# Patient Record
Sex: FEMALE | NOT HISPANIC OR LATINO
[De-identification: names, ages, dates, MRNs, and addresses within clinical notes are randomized per-mention and may not be internally consistent; named-entity substitution may affect disease eponyms.]

---

## 2017-03-13 PROBLEM — R73.01 IMPAIRED FASTING GLUCOSE: Status: ACTIVE | Noted: 2017-03-13

## 2018-03-05 PROBLEM — M81.0 AGE-RELATED OSTEOPOROSIS WITHOUT CURRENT PATHOLOGICAL FRACTURE: Status: ACTIVE | Noted: 2018-03-05

## 2019-03-18 PROBLEM — Z91.09 ENVIRONMENTAL ALLERGIES: Status: ACTIVE | Noted: 2019-03-18

## 2019-04-30 PROBLEM — Z00.00 ENCOUNTER FOR PREVENTIVE HEALTH EXAMINATION: Status: ACTIVE | Noted: 2019-04-30

## 2019-05-07 ENCOUNTER — RECORD ABSTRACTING (OUTPATIENT)
Age: 54
End: 2019-05-07

## 2019-05-07 DIAGNOSIS — R73.01 IMPAIRED FASTING GLUCOSE: ICD-10-CM

## 2019-05-07 DIAGNOSIS — Z87.39 PERSONAL HISTORY OF OTHER DISEASES OF THE MUSCULOSKELETAL SYSTEM AND CONNECTIVE TISSUE: ICD-10-CM

## 2019-05-07 DIAGNOSIS — L80 VITILIGO: ICD-10-CM

## 2019-05-07 DIAGNOSIS — Z87.19 PERSONAL HISTORY OF OTHER DISEASES OF THE DIGESTIVE SYSTEM: ICD-10-CM

## 2019-05-07 DIAGNOSIS — H04.123 DRY EYE SYNDROME OF BILATERAL LACRIMAL GLANDS: ICD-10-CM

## 2019-05-07 RX ORDER — GINGER ROOT/GINGER ROOT EXT 262.5 MG
600-800 CAPSULE ORAL
Refills: 0 | Status: ACTIVE | COMMUNITY

## 2019-05-17 ENCOUNTER — APPOINTMENT (OUTPATIENT)
Dept: ENDOCRINOLOGY | Facility: CLINIC | Age: 54
End: 2019-05-17
Payer: COMMERCIAL

## 2019-05-17 VITALS
HEIGHT: 66 IN | DIASTOLIC BLOOD PRESSURE: 68 MMHG | BODY MASS INDEX: 19.29 KG/M2 | HEART RATE: 81 BPM | WEIGHT: 120 LBS | SYSTOLIC BLOOD PRESSURE: 98 MMHG

## 2019-05-17 PROCEDURE — 99213 OFFICE O/P EST LOW 20 MIN: CPT

## 2019-05-17 RX ORDER — ALENDRONATE SODIUM 70 MG/1
70 TABLET ORAL
Refills: 0 | Status: DISCONTINUED | COMMUNITY
End: 2019-05-17

## 2019-05-17 RX ORDER — RISEDRONATE SODIUM 30.1; 4.9 MG/1; MG/1
35 TABLET, DELAYED RELEASE ORAL
Refills: 0 | Status: DISCONTINUED | COMMUNITY
End: 2019-05-17

## 2019-05-27 NOTE — HISTORY OF PRESENT ILLNESS
[FreeTextEntry1] : May 17, 2019\par \par 55 yo with severe osteoporosis of the spine.   She did not tolerate oral bisphosphonates.\par She should receive an anabolic.\par Will request Tymlos 80 mg daily.\par Her [facility] def[facility] [place] \par \par \par \par Has [facility] [place] [facility]  Rx BIN [id number]\par RxPCN [id number]\par RxGRP [id number]\par ID [id number] via Karthik Burch her        phone [phone]    \par \par To [facility] for X-ray spine \par \par \par \par Previous notes from eClinical Works appended below.\par \par   August 9, 2018\par            .\par            PCP: Dr. Gita guan [phone]\par            .\par            CC: Osteoporosis, at [facility]:\par             2/1/2018 showed\par             spine T -3.2 down 17.2 % from 2005\par            .\par            52 yo with osteoporosis of spine, T score - 3.2\par            CTXs over 1000, started on alendronate, but developed severe heartburn so switched to delayed release Atelvia, but with similar symptoms.\par            .\par            Impression: Markers of bone turnover showed she was absorbing and responding to medication; however, she does not tolerate oral antiresorptive medication.\par            .\par            Plan: Needs parenteral treatment. For spine T -3.2, at this point, it would make sense to treat with anabolic (Forteo or Tymlos) for 1 1/2 - 2 years followed by Prolia for as long as needed and then completing cycle with Reclast. She will think about this. \par            .==\par            .\par            July 13, 2018\par            .\par            PCP: Dr. Gita guan [phone]\par            .\par            CC: Osteoporosis, at [facility]:\par             2/1/2018 showed\par             spine T -3.2 down 17.2 % from 2005\par            .\par            52 yo with osteoporosis of the spine: T score -.3.2\par            All studies for metabolic contributors to osteoporosis were negative.\par            In February CTXs CTXs 1158\par            and repeat and by \par            7/5/2018 CTX s 697 suggesting good absorption of alendronate.\par            However, she developed heartburn and the alendronate was discontinued. . \par            .\par            Impression: ntire\par            Plan: Trial of Atelvia 35 mg after breakfast weekly. She will notify me if any symptoms develop. ROV in about 4 months.\par            Thank you. -\par            .

## 2019-07-09 ENCOUNTER — APPOINTMENT (OUTPATIENT)
Dept: ENDOCRINOLOGY | Facility: CLINIC | Age: 54
End: 2019-07-09
Payer: COMMERCIAL

## 2019-07-09 PROCEDURE — 99213 OFFICE O/P EST LOW 20 MIN: CPT

## 2019-07-09 NOTE — PHYSICAL EXAM
[Alert] : alert [No Acute Distress] : no acute distress [Well Nourished] : well nourished [Well Developed] : well developed [Healthy Appearance] : healthy appearance [No Stigmata of Cushings Syndrome] : no stigmata of cushings syndrome [Normal Voice/Communication] : normal voice communication [Normal Insight/Judgement] : insight and judgment were intact [Normal Affect] : the affect was normal [Normal Mood] : the mood was normal

## 2019-07-09 NOTE — HISTORY OF PRESENT ILLNESS
[FreeTextEntry1] : July 9, 2019\par  PCP: Dr. 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  \par To start on Tymlos.\par Visits for instruction and to plan follow up.  \par \par Plan:  BMP etc in 3 weeks and in 4 months (October) and ROV November.  \par            .\par \par \par \par May 17, 2019\par \par 55 yo with severe osteoporosis of the spine.   She did not tolerate oral bisphosphonates.\par She should receive an anabolic.\par Will request Tymlos 80 mg daily.\par Her [facility] [place] at \par \par \par \par Has [facility] [place]  Rx BIN [id number]\par RxPCN [id number]\par RxGRP [id number]\par ID [id number] via Karthik Burch her        phone [phone]    \par \par To [facility] for X-ray spine \par \par \par \par Previous notes from eClinical Works appended below.\par \par   August 9, 2018\par            .\par            PCP: Dr. Gita Feldmna f [phone]\par            .\par            CC: Osteoporosis, at [facility] Imaging:\par             2/1/2018 showed\par             spine T -3.2 down 17.2 % from 2005\par            .\par            54 yo with osteoporosis of spine, T score - 3.2\par            CTXs over 1000, started on alendronate, but developed severe heartburn so switched to delayed release Atelvia, but with similar symptoms.\par            .\par            Impression: Markers of bone turnover showed she was absorbing and responding to medication; however, she does not tolerate oral antiresorptive medication.\par            .\par            Plan: Needs parenteral treatment. For spine T -3.2, at this point, it would make sense to treat with anabolic (Forteo or Tymlos) for 1 1/2 - 2 years followed by Prolia for as long as needed and then completing cycle with Reclast. She will think about this. \par            .==\par            .\par            July 13, 2018\par            .\par            PCP: Dr. Gita guan [phone]\par            .\par            CC: Osteoporosis, at [facility] Imaging:\par             2/1/2018 showed\par             spine T -3.2 down 17.2 % from 2005\par            .\par            54 yo with osteoporosis of the spine: T score -.3.2\par            All studies for metabolic contributors to osteoporosis were negative.\par            In February CTXs CTXs 1158\par            and repeat and by \par            7/5/2018 CTX s 697 suggesting good absorption of alendronate.\par            However, she developed heartburn and the alendronate was discontinued. . \par            .\par            Impression: ntire\par            Plan: Trial of Atelvia 35 mg after breakfast weekly. She will notify me if any symptoms develop. ROV in about 4 months.\par            Thank you. -\par            .

## 2019-07-09 NOTE — ASSESSMENT
[FreeTextEntry1] : ~\par Did well with self injection of Tymlos.\par Follow up labs in 3 weeks and in about 4-5 months\par Potential side effects discussed

## 2019-08-21 ENCOUNTER — RX RENEWAL (OUTPATIENT)
Age: 54
End: 2019-08-21

## 2019-10-09 ENCOUNTER — HOSPITAL ENCOUNTER (OUTPATIENT)
Dept: HOSPITAL 74 - FASU | Age: 54
Discharge: HOME | End: 2019-10-09
Attending: ORTHOPAEDIC SURGERY
Payer: COMMERCIAL

## 2019-10-09 VITALS — DIASTOLIC BLOOD PRESSURE: 62 MMHG | SYSTOLIC BLOOD PRESSURE: 110 MMHG | HEART RATE: 62 BPM | TEMPERATURE: 97.9 F

## 2019-10-09 VITALS — BODY MASS INDEX: 19.3 KG/M2

## 2019-10-09 DIAGNOSIS — M18.12: Primary | ICD-10-CM

## 2019-10-09 PROCEDURE — 0LX80ZZ TRANSFER LEFT HAND TENDON, OPEN APPROACH: ICD-10-PCS | Performed by: ORTHOPAEDIC SURGERY

## 2019-10-14 NOTE — OP
DATE OF OPERATION:  10/09/2019

 

PREOPERATIVE DIAGNOSIS:  Left basal joint arthritis.

 

POSTOPERATIVE DIAGNOSIS:  Left basal joint arthritis.

 

OPERATIVE PROCEDURE:

1.  Left basal joint arthroplasty.

2.  Left carpometacarpal joint tendon transfer.

 

SURGEON:  Rohit Ghosh MD

 

ASSISTANT:  FANNY Quiles

 

ANESTHESIA:  Regional.

 

COMPLICATIONS:  None.

 

ESTIMATED BLOOD LOSS:  Minimal.

 

INDICATION FOR PROCEDURE:  The patient is a 54-year-old female, status post right

basal joint arthroplasty, from which she has done very well, was indicated for left

basal joint arthroplasty at the patient's request.  Risks, benefits, and alternatives

were discussed with patient at length.  Proper informed consent was obtained.

 

PROCEDURE:  After proper identification of the patient and the correct operative

site, patient was brought to the operating room, placed supine on the operating

table.  Prominences were well padded.  Sedation and regional anesthesia were given. 

Left upper extremity was prepped and draped in usual sterile fashion.  Well-padded

tourniquet was placed as well as a sterile prep.  Esmarch bandage was used to

exsanguinate the left upper extremity.  Tourniquet was inflated to 250 mmHg.

 

Curvilinear incision made at the base of the thumb metacarpal with a Seo-type

approach.  Incision was taken sharply through the skin, with blunt dissection through

the subcutaneous tissues.  Neurovascular structures were carefully protected.  Thenar

muscles were elevated off the carpometacarpal joint.  Longitudinal incision made at

the carpometacarpal joint and the soft tissue was elevated off of the trapezium in

whole.  Severe arthrosis was noted at the carpometacarpal joint, and the trapezium

was excised in whole.  Arthroplasty of _____ space was found to be adequate.  A

suspensionplasty/arthroplasty was performed by placing an Arthrex SwiveLock anchor at

the base of the index metacarpal, attached to an Arthrex FiberTape-type suture.  This

was placed in hammock fashion beneath the thumb metacarpal, suspending it in its

natural position and secured to the thumb metacarpal with a second SwiveLock anchor. 

This was done with appropriate tensioning and achieved full range of motion with good

positioning once it was completed.  The arthroplasty space was then closed, doing a

side-to-side repair of the joint capsule.  Tendon transfer of the abductor pollicis

brevis to the joint capsule repair was performed for added stability.  The wound was

irrigated and repaired with 4-0 Vicryl and 4-0 Monocryl sutures.  Steri-Strips,

sterile dressings were applied.  Splint was placed.  Patient was reversed from

anesthesia and brought to recovery in stable condition.  She tolerated the procedure

well.

 

Negro Escobedo, the assistant, was integral throughout the procedure.  Procedure

could not have been performed without a skilled operative assistant.

 

 

FLORIAN JENSEN/[id number]

DD: 10/14/2019 17:31

DT: 10/14/2019 20:31

Job #:  [id number]

## 2019-11-26 ENCOUNTER — APPOINTMENT (OUTPATIENT)
Dept: ENDOCRINOLOGY | Facility: CLINIC | Age: 54
End: 2019-11-26
Payer: COMMERCIAL

## 2019-11-26 VITALS
BODY MASS INDEX: 19.13 KG/M2 | WEIGHT: 119 LBS | HEIGHT: 66 IN | HEART RATE: 81 BPM | DIASTOLIC BLOOD PRESSURE: 60 MMHG | SYSTOLIC BLOOD PRESSURE: 100 MMHG

## 2019-11-26 PROCEDURE — 99214 OFFICE O/P EST MOD 30 MIN: CPT

## 2019-11-29 NOTE — PHYSICAL EXAM
[Alert] : alert [Well Nourished] : well nourished [No Acute Distress] : no acute distress [Well Developed] : well developed [Healthy Appearance] : healthy appearance [Normal Voice/Communication] : normal voice communication [No Stigmata of Cushings Syndrome] : no stigmata of cushings syndrome [Normal Insight/Judgement] : insight and judgment were intact [Normal Affect] : the affect was normal [Normal Mood] : the mood was normal

## 2019-11-29 NOTE — HISTORY OF PRESENT ILLNESS
[FreeTextEntry1] : Nov 26, 2019\par  PCP: Dr. Gita guan [phone]\par          Ortho:  Dr. Gunnar Bear  for hand  \par            .\par            CC: Osteoporosis, at [facility]:\par             2/1/2018 showed\par             spine T -3.2 down 17.2 % from 2005\par \par CC: Osteoporosis, at [facility]:\par             2/1/2018 showed\par             spine T -3.2 down 17.2 % from 2005\par        2011 - arthritis of hands -   surg R hand 2016,  surg left hand 2019 - by Dr. Bear\par               it is called "basal joint arthritis".  \par \par Taking Tymlos without difficulty  \par Next BD can be after 2/1/2020 \par            \par \par Started on Tymlos July 19, 2019 after cleared with her orthopedic doctor.\par \par Currently out of work.   2\par \par   \par            .\par \par \par \par \par \par \par July 9, 2019\par  PCP: Dr. 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  \par To start on Tymlos.\par Visits for instruction and to plan follow up.  \par \par Plan:  BMP etc in 3 weeks and in 4 months (October) and ROV November.  \par            .\par \par \par \par May 17, 2019\par \par 55 yo with severe osteoporosis of the spine.   She did not tolerate oral bisphosphonates.\par She should receive an anabolic.\par Will request Tymlos 80 mg daily.\par Her [facility] def[facility] [place] at \par \par \par \par Has [facility] [place]  Rx BIN [id number]\par RxPCN [id number]\par RxGRP [id number]\par ID [id number] via Karthik Burch her        phone [phone]    \par \par To [facility] for X-ray spine \par \par \par \par Previous notes from eClinical Works appended below.\par \par   August 9, 2018\par            .\par            PCP: Dr. Gita guan [phone]\par            .\par            CC: Osteoporosis, at [facility]:\par             2/1/2018 showed\par             spine T -3.2 down 17.2 % from 2005\par            .\par            52 yo with osteoporosis of spine, T score - 3.2\par            CTXs over 1000, started on alendronate, but developed severe heartburn so switched to delayed release Atelvia, but with similar symptoms.\par            .\par            Impression: Markers of bone turnover showed she was absorbing and responding to medication; however, she does not tolerate oral antiresorptive medication.\par            .\par            Plan: Needs parenteral treatment. For spine T -3.2, at this point, it would make sense to treat with anabolic (Forteo or Tymlos) for 1 1/2 - 2 years followed by Prolia for as long as needed and then completing cycle with Reclast. She will think about this. \par            .==\par            .\par            July 13, 2018\par            .\par            PCP: Dr. Gita guan [phone]\par            .\par            CC: Osteoporosis, at [facility] Imaging:\par             2/1/2018 showed\par             spine T -3.2 down 17.2 % from 2005\par            .\par            52 yo with osteoporosis of the spine: T score -.3.2\par            All studies for metabolic contributors to osteoporosis were negative.\par            In February CTXs CTXs 1158\par            and repeat and by \par            7/5/2018 CTX s 697 suggesting good absorption of alendronate.\par            However, she developed heartburn and the alendronate was discontinued. . \par            .\par            Impression: ntire\par            Plan: Trial of Atelvia 35 mg after breakfast weekly. She will notify me if any symptoms develop. ROV in about 4 months.\par            Thank you. -shruthi\par            .

## 2020-04-02 ENCOUNTER — APPOINTMENT (OUTPATIENT)
Dept: ENDOCRINOLOGY | Facility: CLINIC | Age: 55
End: 2020-04-02
Payer: COMMERCIAL

## 2020-04-02 PROCEDURE — 99443: CPT

## 2020-04-02 NOTE — ASSESSMENT
[FreeTextEntry1] : Had good response to Tymlos.\par Will consider for up to two years and then switch to antiresorptive most likely.

## 2020-09-17 ENCOUNTER — APPOINTMENT (OUTPATIENT)
Dept: ENDOCRINOLOGY | Facility: CLINIC | Age: 55
End: 2020-09-17
Payer: COMMERCIAL

## 2020-09-17 VITALS
HEIGHT: 66 IN | BODY MASS INDEX: 18.32 KG/M2 | WEIGHT: 114 LBS | SYSTOLIC BLOOD PRESSURE: 100 MMHG | OXYGEN SATURATION: 99 % | DIASTOLIC BLOOD PRESSURE: 60 MMHG | HEART RATE: 82 BPM

## 2020-09-17 LAB
25(OH)D3 SERPL-MCNC: 38 NG/ML (ref 30–100)
ALB/GLOBRATIO, 58C: 1.6 (CALC) (ref 1–2.5)
ALBUMIN SERPL-MCNC: 4.6 G/DL (ref 3.6–5.1)
ALKALINE PHOSPHATASE, TOTAL, 25002000: 96 U/L (ref 37–153)
ALT SERPL-CCNC: 15 U/L (ref 6–29)
AST SERPL W P-5'-P-CCNC: 22 U/L (ref 10–35)
BILIRUB SERPL-MCNC: 1.5 MG/DL (ref 0.2–1.2)
BILIRUBIN, DIRECT,CBIL: 0.3 MG/DL (ref 0–0.2)
BILIRUBIN, INDIRECT,UBIL: 1.2 MG/DL (CALC) (ref 0.2–1.2)
BUN SERPL-MCNC: 10 MG/DL (ref 7–25)
BUN/CREATININE RATIO,BUCR: NORMAL (CALC) (ref 6–22)
C TELOPEPTIDE (CTX): 877 PG/ML
CALCIUM SERPL-MCNC: 10.3 MG/DL (ref 8.6–10.4)
CHLORIDE SERPL-SCNC: 103 MMOL/L (ref 98–110)
CHOL/HDL RATIO,CHHDX: 2.2 (CALC)
CHOLEST SERPL-MCNC: 203 MG/DL
CO2 SERPL-SCNC: 28 MMOL/L (ref 20–32)
CREAT SERPL-MCNC: 0.6 MG/DL (ref 0.5–1.05)
GLOBULIN,GLOB: 2.8 G/DL (CALC) (ref 1.9–3.7)
GLUCOSE SERPL-MCNC: 91 MG/DL (ref 65–99)
HBA1C MFR BLD HPLC: 5.6 % OF TOTAL HGB
HDLC SERPL-MCNC: 91 MG/DL
LDL-CHOLESTEROL: 92 MG/DL (CALC)
Lab: 43 NG/ML (ref 8–32)
NON-HDL CHOLESTEROL, 011976: 112 MG/DL (CALC)
PHOSPHATE SERPL-MCNC: 4 MG/DL (ref 2.5–4.5)
POTASSIUM SERPL-SCNC: 4.3 MMOL/L (ref 3.5–5.3)
PROT SERPL-MCNC: 7.4 G/DL (ref 6.1–8.1)
SODIUM SERPL-SCNC: 140 MMOL/L (ref 135–146)
TRIGL SERPL-MCNC: 105 MG/DL (ref ?–150)
TSH SERPL DL<=0.005 MIU/L-ACNC: 0.87 MIU/L (ref 0.4–4.5)

## 2020-09-17 PROCEDURE — 99214 OFFICE O/P EST MOD 30 MIN: CPT

## 2020-09-18 NOTE — HISTORY OF PRESENT ILLNESS
[FreeTextEntry1] : Sep 17, 2020      in person\par \par PCP: Dr. Gita Feldman f [phone]\par          Ortho:  Dr. Gunnar Bear  for hand  \par            .\par CC: Osteoporosis, at [facility] Imaging:\par             2/1/2018 showed\par             spine T -3.2 down 17.2 % from 2005\par        2011 - arthritis of hands -   surg R hand 2016,  surg left hand 2019 - by Dr. Bear\par               it is called "basal joint arthritis".  \par \par Started on Tymlos July 2019.    Will aim to switch to Prolia as of May or June of 2021 at latest.  \par Most recent bone density at [facility] 2/28/2020 showed spine T score improved to -2.8, FN -1.8, TH -1.5\par \par \par \par \par \par Lab test on\par 9/11/2020 include\par TSH 0.87\par osteocalcin 43\par CTXs 877\par 25 OH vitamin D 38 b\par \par \par \par Apr 02, 2020\par \par This is a 21+ minute Tele-Phonic encounter with an established patient which was initiated by the patient during a time scheduled for a visit and the patient is aware that this may be a billable encounter.  The patient has not seen a provider of my specialty (Endocrinology) within out group in the past 7 days and this encounter is not anticipated to result in a scheduled in-person visit within he next 7 days.\par The reason for this Tele-Phonic encounter is listed below under "CC:"\par Verbal consent was discussed and obtained from the patient for this visit:  "You have chosen to receive care through the use of tele-media or telephone.   This enables health care providers at different locations to provide safe, effective, and convenient care through the use of technology.  Please note this is a billable encounter.  As with any health care service, there are risks associated with the use of tele-media or telephone, including equipment failure, poor image and/or resolution, and  issues.  You understand that I cannot physically examine you and that you may need to come to the office to complete the assessment.\par \par Patient agreed verbally to understanding the risks, benefits, alternatives of Tele-Media and telephone as explained.  All questions regarding tele-media and telephone encounters were answered. \par \par PCP: Dr. Gita guan [phone]\par          Ortho:  Dr. Gunnar Bear  for hand  \par            .\par            CC: Osteoporosis, at [facility] Imaging:\par             2/1/2018 showed\par             spine T -3.2 down 17.2 % from 2005\par \par CC: Osteoporosis, at [facility] Imaging:\par             2/1/2018 showed\par             spine T -3.2 down 17.2 % from 2005\par        2011 - arthritis of hands -   surg R hand 2016,  surg left hand 2019 - by Dr. Bear\par               it is called "basal joint arthritis".  \par \par Taking Tymlos without difficulty  \par Bone denshowed considerable improvement in spine, T -3.2 to -2.8\par \par Labs done at [facility] via Dr. Gita Feldman Bon [facility] fax  included\par osteocalcin 41 (8-32) -Tymlos effect\par WBC 3.9\par Hct 37.7 \par glucose 94\par calcium 10.0\par albumn 4.3 \par alk phos 84 \par vit D 42\par TSH 0.86\par B12 850\par \par Impression:  Doing very well.   \par Tymlos renewed.\par \par \par \par Nov 26, 2019\par  PCP: Dr. Gita guan [phone]\par          Ortho:  Dr. Gunnar Bear  for hand  \par            .\par            CC: Osteoporosis, at [facility] Imaging:\par             2/1/2018 showed\par             spine T -3.2 down 17.2 % from 2005\par \par CC: Osteoporosis, at [facility] Imaging:\par             2/1/2018 showed\par             spine T -3.2 down 17.2 % from 2005\par        2011 - arthritis of hands -   surg R hand 2016,  surg left hand 2019 - by Dr. Bear\par               it is called "basal joint arthritis".  \par \par Taking Tymlos without difficulty  \par Next BD can be after 2/1/2020 \par            \par \par Started on Tymlos July 19, 2019 after cleared with her orthopedic doctor.\par \par Currently out of work.   2\par \par   \par            .\par \par \par \par \par \par \par July 9, 2019\par  PCP: Dr. 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  \par \par Plan:  BMP etc in 3 weeks and in 4 months (October) and ROV November.  \par            .\par \par \par \par May 17, 2019\par \par 55 yo with severe osteoporosis of the spine.   She did not tolerate oral bisphosphonates.\par She should receive an anabolic.\par Will request Tymlos 80 mg daily.\par Her [facility] def[facility] [place] \par \par \par \par Has [facility] [place] [facility]  Rx BIN [id number]\par RxPCN [id number]\par RxGRP [id number]\par ID [id number] via Karthik Bruch her        phone [phone]    \par \par To [facility] for X-ray spine \par \par \par \par Previous notes from eClinical Works appended below.\par \par   August 9, 2018\par            .\par            PCP: Dr. Gita guan [phone]\par            .\par            CC: Osteoporosis, at [facility] Imaging:\par             2/1/2018 showed\par             spine T -3.2 down 17.2 % from 2005\par            .\par            54 yo with osteoporosis of spine, T score - 3.2\par            CTXs over 1000, started on alendronate, but developed severe heartburn so switched to delayed release Atelvia, but with similar symptoms.\par            .\par            Impression: Markers of bone turnover showed she was absorbing and responding to medication; however, she does not tolerate oral antiresorptive medication.\par            .\par            Plan: Needs parenteral treatment. For spine T -3.2, at this point, it would make sense to treat with anabolic (Forteo or Tymlos) for 1 1/2 - 2 years followed by Prolia for as long as needed and then completing cycle with Reclast. She will think about this. \par            .==\par            .\par            July 13, 2018\par            .\par            PCP: Dr. Gita guan [phone]\par            .\par            CC: Osteoporosis, at [facility] Imaging:\par             2/1/2018 showed\par             spine T -3.2 down 17.2 % from 2005\par            .\par            54 yo with osteoporosis of the spine: T score -.3.2\par            All studies for metabolic contributors to osteoporosis were negative.\par            In February CTXs CTXs 1158\par            and repeat and by \par            7/5/2018 CTX s 697 suggesting good absorption of alendronate.\par            However, she developed heartburn and the alendronate was discontinued. . \par            .\par            Impression: ntire\par            Plan: Trial of Atelvia 35 mg after breakfast weekly. She will notify me if any symptoms develop. ROV in about 4 months.\par            Thank you. -\par            .

## 2020-09-18 NOTE — REVIEW OF SYSTEMS
[Joint Pain] : joint pain [Joint Stiffness] : joint stiffness [All other systems negative] : All other systems negative

## 2020-09-18 NOTE — PHYSICAL EXAM
[Alert] : alert [Well Nourished] : well nourished [Healthy Appearance] : healthy appearance [No Acute Distress] : no acute distress [Well Developed] : well developed [No Proptosis] : no proptosis [No Respiratory Distress] : no respiratory distress [No Stigmata of Cushings Syndrome] : no stigmata of Cushings Syndrome [Normal Gait] : normal gait [No Involuntary Movements] : no involuntary movements were seen [No Tremors] : no tremors [Oriented x3] : oriented to person, place, and time [Normal Affect] : the affect was normal [Normal Insight/Judgement] : insight and judgment were intact [Normal Mood] : the mood was normal

## 2020-10-26 ENCOUNTER — NON-APPOINTMENT (OUTPATIENT)
Age: 55
End: 2020-10-26

## 2020-12-15 ENCOUNTER — APPOINTMENT (OUTPATIENT)
Dept: ENDOCRINOLOGY | Facility: CLINIC | Age: 55
End: 2020-12-15
Payer: COMMERCIAL

## 2020-12-15 PROCEDURE — 99214 OFFICE O/P EST MOD 30 MIN: CPT | Mod: 95

## 2020-12-15 NOTE — ASSESSMENT
[FreeTextEntry1] : She will see her PCP and have labs by her and me in January and setup f/u visit to me for early Feb and which time we will conclude discussion on next medications

## 2020-12-15 NOTE — HISTORY OF PRESENT ILLNESS
[Home] : at home, [unfilled] , at the time of the visit. [Medical Office: ([facility])___] : at the medical office located in  [Verbal consent obtained from patient] : the patient, [unfilled] [FreeTextEntry1] : Dec 15, 2020     videochat  [employer]\par \par PCP: Dr. Gita guan [phone]\par          Ortho:  Dr. Gunnar Bear  for hand  \par            .\par CC: Osteoporosis, at [facility]:\par             2/1/2018 showed\par             spine T -3.2 down 17.2 % from 2005\par        2011 - arthritis of hands -   surg R hand 2016,  surg left hand 2019 - by Dr. Bear\par               it is called "basal joint arthritis".  \par \par Taking Tymlos, notes 6 lb weight loss and also\par notes rapid puls.\par She did not tolerate Fosamax in the past b/o heartburn.\par She has read online about side effects of Prolia and does not want to take that.\par \par Impression:  Doing very well.\par \par Plan:  Continue Tymlos through March.\par I have advised her to take Prolia, but she may end up agreeing to Reclast.  \par Long discussion about safety of Prolia and other option of Reclast (\par \par \par Sep 17, 2020      in person\par \par PCP: Dr. Gita guan [phone]\par          Ortho:  Dr. Gunnar Bear  for hand  \par            .\par CC: Osteoporosis, at [facility]:\par             2/1/2018 showed\par             spine T -3.2 down 17.2 % from 2005\par        2011 - arthritis of hands -   surg R hand 2016,  surg left hand 2019 - by Dr. Bear\par               it is called "basal joint arthritis".  \par \par Started on Tymlos July 2019.    Will aim to switch to Prolia as of May or June of 2021 at latest.  \par Most recent bone density at [facility] 2/28/2020 showed spine T score improved to -2.8, FN -1.8, TH -1.5\par \par \par \par \par \par Lab test on\par 9/11/2020 include\par TSH 0.87\par osteocalcin 43\par CTXs 877\par 25 OH vitamin D 38 b\par \par \par \par Apr 02, 2020\par \par This is a 21+ minute Tele-Phonic encounter with an established patient which was initiated by the patient during a time scheduled for a visit and the patient is aware that this may be a billable encounter.  The patient has not seen a provider of my specialty (Endocrinology) within out group in the past 7 days and this encounter is not anticipated to result in a scheduled in-person visit within he next 7 days.\par The reason for this Tele-Phonic encounter is listed below under "CC:"\par Verbal consent was discussed and obtained from the patient for this visit:  "You have chosen to receive care through the use of tele-media or telephone.   This enables health care providers at different locations to provide safe, effective, and convenient care through the use of technology.  Please note this is a billable encounter.  As with any health care service, there are risks associated with the use of tele-media or telephone, including equipment failure, poor image and/or resolution, and  issues.  You understand that I cannot physically examine you and that you may need to come to the office to complete the assessment.\par \par Patient agreed verbally to understanding the risks, benefits, alternatives of Tele-Media and telephone as explained.  All questions regarding tele-media and telephone encounters were answered. \par \par PCP: Dr. Gita Feldman f [phone]\par          Ortho:  Dr. Gunnar Bear  for hand  \par            .\par            CC: Osteoporosis, at [facility]:\par             2/1/2018 showed\par             spine T -3.2 down 17.2 % from 2005\par \par CC: Osteoporosis, at [facility]:\par             2/1/2018 showed\par             spine T -3.2 down 17.2 % from 2005\par        2011 - arthritis of hands -   surg R hand 2016,  surg left hand 2019 - by Dr. Bear\par               it is called "basal joint arthritis".  \par \par Taking Tymlos without difficulty  \par Bone denshowed considerable improvement in spine, T -3.2 to -2.8\par \par Labs done at [facility] via Dr. Gita Feldman [facility] fax  included\par osteocalcin 41 (8-32) -Tymlos effect\par WBC 3.9\par Hct 37.7 \par glucose 94\par calcium 10.0\par albumn 4.3 \par alk phos 84 \par vit D 42\par TSH 0.86\par B12 850\par \par Impression:  Doing very well.   \par Tymlos renewed.\par \par \par \par Nov 26, 2019\par  PCP: Dr. Gita guan [phone]\par          Ortho:  Dr. Gunnar Bear  for hand  \par            .\par            CC: Osteoporosis, at [facility]:\par             2/1/2018 showed\par             spine T -3.2 down 17.2 % from 2005\par \par CC: Osteoporosis, at [facility]:\par             2/1/2018 showed\par             spine T -3.2 down 17.2 % from 2005\par        2011 - arthritis of hands -   surg R hand 2016,  surg left hand 2019 - by Dr. Bear\par               it is called "basal joint arthritis".  \par \par Taking Tymlos without difficulty  \par Next BD can be after 2/1/2020 \par            \par \par Started on Tymlos July 19, 2019 after cleared with her orthopedic doctor.\par \par Currently out of work.   2\par \par   \par            .\par \par \par \par \par \par \par July 9, 2019\par  PCP: Dr. 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  \par To start on Tymlos.\par Visits for instruction and to plan follow up.  \par \par Plan:  BMP etc in 3 weeks and in 4 months (October) and ROV November.  \par            .\par \par \par \par May 17, 2019\par \par 55 yo with severe osteoporosis of the spine.   She did not tolerate oral bisphosphonates.\par She should receive an anabolic.\par Will request Tymlos 80 mg daily.\par Her [employer] prescription plan defers to the [facility] at \par \par \par \par Has [facility] via [place] [facility]  Rx BIN [id number]\par RxPCN [id number]\par RxGRP [id number]\par ID [id number] via Karthik Burch her        phone [phone]    \par \par To [facility] for X-ray spine \par \par \par \par Previous notes from [employer] appended below.\par \par   August 9, 2018\par            .\par            PCP: Dr. Gita guan [phone]\par            .\par            CC: Osteoporosis, at [facility]:\par             2/1/2018 showed\par             spine T -3.2 down 17.2 % from 2005\par            .\par            52 yo with osteoporosis of spine, T score - 3.2\par            CTXs over 1000, started on alendronate, but developed severe heartburn so switched to delayed release Atelvia, but with similar symptoms.\par            .\par            Impression: Markers of bone turnover showed she was absorbing and responding to medication; however, she does not tolerate oral antiresorptive medication.\par            .\par            Plan: Needs parenteral treatment. For spine T -3.2, at this point, it would make sense to treat with anabolic (Forteo or Tymlos) for 1 1/2 - 2 years followed by Prolia for as long as needed and then completing cycle with Reclast. She will think about this. \par            .==\par            .\par            July 13, 2018\par            .\par            PCP: Dr. Gita guan [phone]\par            .\par            CC: Osteoporosis, at [facility]:\par             2/1/2018 showed\par             spine T -3.2 down 17.2 % from 2005\par            .\par            52 yo with osteoporosis of the spine: T score -.3.2\par            All studies for metabolic contributors to osteoporosis were negative.\par            In February CTXs CTXs 1158\par            and repeat and by \par            7/5/2018 CTX s 697 suggesting good absorption of alendronate.\par            However, she developed heartburn and the alendronate was discontinued. . \par            .\par            Impression: ntire\par            Plan: Trial of Atelvia 35 mg after breakfast weekly. She will notify me if any symptoms develop. ROV in about 4 months.\par            Thank you. -\par            .

## 2021-04-30 ENCOUNTER — APPOINTMENT (OUTPATIENT)
Dept: ENDOCRINOLOGY | Facility: CLINIC | Age: 56
End: 2021-04-30
Payer: COMMERCIAL

## 2021-04-30 VITALS
HEART RATE: 80 BPM | HEIGHT: 66 IN | DIASTOLIC BLOOD PRESSURE: 56 MMHG | SYSTOLIC BLOOD PRESSURE: 100 MMHG | BODY MASS INDEX: 18.48 KG/M2 | OXYGEN SATURATION: 98 % | WEIGHT: 115 LBS

## 2021-04-30 PROCEDURE — 99214 OFFICE O/P EST MOD 30 MIN: CPT

## 2021-04-30 PROCEDURE — 99072 ADDL SUPL MATRL&STAF TM PHE: CPT

## 2021-04-30 RX ORDER — ELECTROLYTES/DEXTROSE
31G X 5 MM SOLUTION, ORAL ORAL
Qty: 100 | Refills: 1 | Status: ACTIVE | COMMUNITY
Start: 2019-08-21 | End: 1900-01-01

## 2021-05-07 NOTE — ASSESSMENT
[FreeTextEntry1] : Osteoporosis, on Tymlos,\par Time to start making plans for antiresorptive.\par Prolia would be of most benefit

## 2021-05-07 NOTE — HISTORY OF PRESENT ILLNESS
[FreeTextEntry1] : Apr 30, 2021    in person\par \par PCP: Dr. Gita guan [phone]\par          Ortho:  Dr. Gunnar Bear  for hand  \par            .\par CC: Osteoporosis, at [facility]:\par             2/1/2018 showed\par             spine T -3.2 down 17.2 % from 2005\par        2011 - arthritis of hands -   surg R hand 2016,  surg left hand 2019 - by Dr. Bear\par               it is called "basal joint arthritis".  \par \par \par Lost weight down to 113 lb, now heading back   She believes this was triggered by Tymlos.\par She needs to transition to an antiresorptive and Prolia would be of most benefit. \par \par \par \par \par \par \par Dec 15, 2020     videochat  [employer]\par \par PCP: Dr. Gita guan [phone]\par          Ortho:  Dr. Gunnar Bear  for hand  \par            .\par CC: Osteoporosis, at [facility]:\par             2/1/2018 showed\par             spine T -3.2 down 17.2 % from 2005\par        2011 - arthritis of hands -   surg R hand 2016,  surg left hand 2019 - by Dr. Bear\par               it is called "basal joint arthritis".  \par \par Taking Tymlos, notes 6 lb weight loss and also\par notes rapid puls.\par She did not tolerate Fosamax in the past b/o heartburn.\par She has read online about side effects of Prolia and does not want to take that.\par \par Impression:  Doing very well.\par \par Plan:  Continue Tymlos through March.\par I have advised her to take Prolia, but she may end up agreeing to Reclast.  \par Long discussion about safety of Prolia and other option of Reclast (\par \par \par Sep 17, 2020      in person\par \par PCP: Dr. Gita guan [phone]\par          Ortho:  Dr. Gunnar Bear  for hand  \par            .\par CC: Osteoporosis, at [facility]:\par             2/1/2018 showed\par             spine T -3.2 down 17.2 % from 2005\par        2011 - arthritis of hands -   surg R hand 2016,  surg left hand 2019 - by Dr. Bear\par               it is called "basal joint arthritis".  \par \par Started on Tymlos July 2019.    Will aim to switch to Prolia as of May or June of 2021 at latest.  \par Most recent bone density at [facility] 2/28/2020 showed spine T score improved to -2.8, FN -1.8, TH -1.5\par \par \par \par \par \par Lab test on\par 9/11/2020 include\par TSH 0.87\par osteocalcin 43\par CTXs 877\par 25 OH vitamin D 38 b\par \par \par \par Apr 02, 2020\par \par This is a 21+ minute Tele-Phonic encounter with an established patient which was initiated by the patient during a time scheduled for a visit and the patient is aware that this may be a billable encounter.  The patient has not seen a provider of my specialty (Endocrinology) within out group in the past 7 days and this encounter is not anticipated to result in a scheduled in-person visit within he next 7 days.\par The reason for this Tele-Phonic encounter is listed below under "CC:"\par Verbal consent was discussed and obtained from the patient for this visit:  "You have chosen to receive care through the use of tele-media or telephone.   This enables health care providers at different locations to provide safe, effective, and convenient care through the use of technology.  Please note this is a billable encounter.  As with any health care service, there are risks associated with the use of tele-media or telephone, including equipment failure, poor image and/or resolution, and  issues.  You understand that I cannot physically examine you and that you may need to come to the office to complete the assessment.\par \par Patient agreed verbally to understanding the risks, benefits, alternatives of Tele-Media and telephone as explained.  All questions regarding tele-media and telephone encounters were answered. \par \par PCP: Dr. Gita guan [phone]\par          Ortho:  Dr. Gunnar Bear  for hand  \par            .\par            CC: Osteoporosis, at [facility]:\par             2/1/2018 showed\par             spine T -3.2 down 17.2 % from 2005\par \par CC: Osteoporosis, at [facility]:\par             2/1/2018 showed\par             spine T -3.2 down 17.2 % from 2005\par        2011 - arthritis of hands -   surg R hand 2016,  surg left hand 2019 - by Dr. Bear\par               it is called "basal joint arthritis".  \par \par Taking Tymlos without difficulty  \par Bone denshowed considerable improvement in spine, T -3.2 to -2.8\par \par Labs done at [facility]  included\par osteocalcin 41 (8-32) -Tymlos effect\par WBC 3.9\par Hct 37.7 \par glucose 94\par calcium 10.0\par albumn 4.3 \par alk phos 84 \par vit D 42\par TSH 0.86\par B12 850\par \par Impression:  Doing very well.   \par Tymlos renewed.\par \par \par \par Nov 26, 2019\par  PCP: Dr. Gita guan [phone]\par          Ortho:  Dr. Gunnar Bear  for hand  \par            .\par            CC: Osteoporosis, at [facility]:\par             2/1/2018 showed\par             spine T -3.2 down 17.2 % from 2005\par \par CC: Osteoporosis, at [facility]:\par             2/1/2018 showed\par             spine T -3.2 down 17.2 % from 2005\par        2011 - arthritis of hands -   surg R hand 2016,  surg left hand 2019 - by Dr. Bear\par               it is called "basal joint arthritis".  \par \par Taking Tymlos without difficulty  \par Next BD can be after 2/1/2020 \par            \par \par Started on Tymlos July 19, 2019 after cleared with her orthopedic doctor.\par \par Currently out of work.   2\par \par   \par            .\par \par \par \par \par \par \par July 9, 2019\par  PCP: Dr. 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  \par To start on Tymlos.\par Visits for instruction and to plan follow up.  \par \par Plan:  BMP etc in 3 weeks and in 4 months (October) and ROV November.  \par            .\par \par \par \par May 17, 2019\par \par 55 yo with severe osteoporosis of the spine.   She did not tolerate oral bisphosphonates.\par She should receive an anabolic.\par Will request Tymlos 80 mg daily.\par Her [employer] prescription plan defers to the [facility] at \par \par \par \par Has [facility] via [place] [facility]  Rx BIN [id number]\par RxPCN [id number]\par RxGRP [id number]\par ID [id number] via Karthik Burch her        phone [phone]    \par \par To [facility] for X-ray spine \par \par \par \par Previous notes from [employer] appended below.\par \par   August 9, 2018\par            .\par            PCP: Dr. Gita guan [phone]\par            .\par            CC: Osteoporosis, at [facility]:\par             2/1/2018 showed\par             spine T -3.2 down 17.2 % from 2005\par            .\par            52 yo with osteoporosis of spine, T score - 3.2\par            CTXs over 1000, started on alendronate, but developed severe heartburn so switched to delayed release Atelvia, but with similar symptoms.\par            .\par            Impression: Markers of bone turnover showed she was absorbing and responding to medication; however, she does not tolerate oral antiresorptive medication.\par            .\par            Plan: Needs parenteral treatment. For spine T -3.2, at this point, it would make sense to treat with anabolic (Forteo or Tymlos) for 1 1/2 - 2 years followed by Prolia for as long as needed and then completing cycle with Reclast. She will think about this. \par            .==\par            .\par            July 13, 2018\par            .\par            PCP: Dr. Gtia guan [phone]\par            .\par            CC: Osteoporosis, at [facility]:\par             2/1/2018 showed\par             spine T -3.2 down 17.2 % from 2005\par            .\par            52 yo with osteoporosis of the spine: T score -.3.2\par            All studies for metabolic contributors to osteoporosis were negative.\par            In February CTXs CTXs 1158\par            and repeat and by \par            7/5/2018 CTX s 697 suggesting good absorption of alendronate.\par            However, she developed heartburn and the alendronate was discontinued. . \par            .\par            Impression: ntire\par            Plan: Trial of Atelvia 35 mg after breakfast weekly. She will notify me if any symptoms develop. ROV in about 4 months.\par            Thank you. -shruthi\par            .

## 2021-06-03 ENCOUNTER — APPOINTMENT (OUTPATIENT)
Dept: ENDOCRINOLOGY | Facility: CLINIC | Age: 56
End: 2021-06-03
Payer: COMMERCIAL

## 2021-06-03 PROCEDURE — 96401 CHEMO ANTI-NEOPL SQ/IM: CPT

## 2021-06-10 RX ORDER — DENOSUMAB 60 MG/ML
60 INJECTION SUBCUTANEOUS
Qty: 1 | Refills: 0 | Status: COMPLETED | OUTPATIENT
Start: 2021-06-10

## 2021-06-10 RX ADMIN — DENOSUMAB 1 MG/ML: 60 INJECTION SUBCUTANEOUS at 00:00

## 2021-06-10 NOTE — HISTORY OF PRESENT ILLNESS
[FreeTextEntry1] : Jun 03, 2021    in person, primarily for Prolia #1.  Last Tymlos last night\par \par \par PCP: Dr. Gita guan [phone]\par          Ortho:  Dr. Gunnar Bear  for hand  \par            .\par CC: Osteoporosis, at [facility]:\par             2/1/2018 showed\par             spine T -3.2 down 17.2 % from 2005\par        2011 - arthritis of hands -   surg R hand 2016,  surg left hand 2019 - by Dr. Bear\par               it is called "basal joint arthritis".  \par \par Weight today by me 122 w/ shoes.   5 ft 5 in tall.\par She exercises considerably by walking, carrying 5 lb weights, using protein supplement to boost muscles and weight.\par Feels well\par Going on trip for a week.\par \par \par Injection provided w/o problem \par \par \par Apr 30, 2021    in person\par \par PCP: Dr. Gita guan [phone]\par          Ortho:  Dr. Gunnar Bear  for hand  \par            .\par CC: Osteoporosis, at [facility]:\par             2/1/2018 showed\par             spine T -3.2 down 17.2 % from 2005\par        2011 - arthritis of hands -   surg R hand 2016,  surg left hand 2019 - by Dr. Bear\par               it is called "basal joint arthritis".  \par \par \par Lost weight down to 113 lb, now heading back   She believes this was triggered by Tymlos.\par She needs to transition to an antiresorptive and Prolia would be of most benefit. \par \par \par \par \par \par \par Dec 15, 2020     videochat  [employer]\par \par PCP: Dr. Gita guan [phone]\par          Ortho:  Dr. Gunnar Bear  for hand  \par            .\par CC: Osteoporosis, at [facility]:\par             2/1/2018 showed\par             spine T -3.2 down 17.2 % from 2005\par        2011 - arthritis of hands -   surg R hand 2016,  surg left hand 2019 - by Dr. Bear\par               it is called "basal joint arthritis".  \par \par Taking Tymlos, notes 6 lb weight loss and also\par notes rapid puls.\par She did not tolerate Fosamax in the past b/o heartburn.\par She has read online about side effects of Prolia and does not want to take that.\par \par Impression:  Doing very well.\par \par Plan:  Continue Tymlos through March.\par I have advised her to take Prolia, but she may end up agreeing to Reclast.  \par Long discussion about safety of Prolia and other option of Reclast (\par \par \par Sep 17, 2020      in person\par \par PCP: Dr. Gita Feldman f [phone]\par          Ortho:  Dr. Gunnar Bear  for hand  \par            .\par CC: Osteoporosis, at [facility]:\par             2/1/2018 showed\par             spine T -3.2 down 17.2 % from 2005\par        2011 - arthritis of hands -   surg R hand 2016,  surg left hand 2019 - by Dr. Bear\par               it is called "basal joint arthritis".  \par \par Started on Tymlos July 2019.    Will aim to switch to Prolia as of May or June of 2021 at latest.  \par Most recent bone density at [facility] 2/28/2020 showed spine T score improved to -2.8, FN -1.8, TH -1.5\par \par \par \par \par \par Lab test on\par 9/11/2020 include\par TSH 0.87\par osteocalcin 43\par CTXs 877\par 25 OH vitamin D 38 b\par \par \par \par Apr 02, 2020\par \par This is a 21+ minute Tele-Phonic encounter with an established patient which was initiated by the patient during a time scheduled for a visit and the patient is aware that this may be a billable encounter.  The patient has not seen a provider of my specialty (Endocrinology) within out group in the past 7 days and this encounter is not anticipated to result in a scheduled in-person visit within he next 7 days.\par The reason for this Tele-Phonic encounter is listed below under "CC:"\par Verbal consent was discussed and obtained from the patient for this visit:  "You have chosen to receive care through the use of tele-media or telephone.   This enables health care providers at different locations to provide safe, effective, and convenient care through the use of technology.  Please note this is a billable encounter.  As with any health care service, there are risks associated with the use of tele-media or telephone, including equipment failure, poor image and/or resolution, and  issues.  You understand that I cannot physically examine you and that you may need to come to the office to complete the assessment.\par \par Patient agreed verbally to understanding the risks, benefits, alternatives of Tele-Media and telephone as explained.  All questions regarding tele-media and telephone encounters were answered. \par \par PCP: Dr. Gita guan [phone]\par          Ortho:  Dr. Gunnar Bear  for hand  \par            .\par            CC: Osteoporosis, at [facility]:\par             2/1/2018 showed\par             spine T -3.2 down 17.2 % from 2005\par \par CC: Osteoporosis, at [facility]:\par             2/1/2018 showed\par             spine T -3.2 down 17.2 % from 2005\par        2011 - arthritis of hands -   surg R hand 2016,  surg left hand 2019 - by Dr. Bear\par               it is called "basal joint arthritis".  \par \par Taking Tymlos without difficulty  \par Bone denshowed considerable improvement in spine, T -3.2 to -2.8\par \par Labs done at [facility] via Dr. Gita Feldman [facility] fax  included\par osteocalcin 41 (8-32) -Tymlos effect\par WBC 3.9\par Hct 37.7 \par glucose 94\par calcium 10.0\par albumn 4.3 \par alk phos 84 \par vit D 42\par TSH 0.86\par B12 850\par \par Impression:  Doing very well.   \par Tymlos renewed.\par \par \par \par Nov 26, 2019\par  PCP: Dr. Gita guan [phone]\par          Ortho:  Dr. Gunnar Bear  for hand  \par            .\par            CC: Osteoporosis, at [facility]:\par             2/1/2018 showed\par             spine T -3.2 down 17.2 % from 2005\par \par CC: Osteoporosis, at [facility]:\par             2/1/2018 showed\par             spine T -3.2 down 17.2 % from 2005\par        2011 - arthritis of hands -   surg R hand 2016,  surg left hand 2019 - by Dr. Bear\par               it is called "basal joint arthritis".  \par \par Taking Tymlos without difficulty  \par Next BD can be after 2/1/2020 \par            \par \par Started on Tymlos July 19, 2019 after cleared with her orthopedic doctor.\par \par Currently out of work.   2\par \par   \par            .\par \par \par \par \par \par \par July 9, 2019\par  PCP: Dr. 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  \par To start on Tymlos.\par Visits for instruction and to plan follow up.  \par \par Plan:  BMP etc in 3 weeks and in 4 months (October) and ROV November.  \par            .\par \par \par \par May 17, 2019\par \par 53 yo with severe osteoporosis of the spine.   She did not tolerate oral bisphosphonates.\par She should receive an anabolic.\par Will request Tymlos 80 mg daily.\par Her [facility] prescription plan defers to the [facility] at \par \par \par \par Has [facility] via [facility]  Rx BIN [id number]\par RxPCN [id number]\par RxGRP [id number]\par ID [id number] via Karthik Burch her        phone [phone]    \par \par To [facility] for X-ray spine \par \par \par \par Previous notes from [employer] appended below.\par \par   August 9, 2018\par            .\par            PCP: Dr. Gita guan [phone]\par            .\par            CC: Osteoporosis, at [facility]:\par             2/1/2018 showed\par             spine T -3.2 down 17.2 % from 2005\par            .\par            54 yo with osteoporosis of spine, T score - 3.2\par            CTXs over 1000, started on alendronate, but developed severe heartburn so switched to delayed release Atelvia, but with similar symptoms.\par            .\par            Impression: Markers of bone turnover showed she was absorbing and responding to medication; however, she does not tolerate oral antiresorptive medication.\par            .\par            Plan: Needs parenteral treatment. For spine T -3.2, at this point, it would make sense to treat with anabolic (Forteo or Tymlos) for 1 1/2 - 2 years followed by Prolia for as long as needed and then completing cycle with Reclast. She will think about this. \par            .==\par            .\par            July 13, 2018\par            .\par            PCP: Dr. Gita guan [phone]\par            .\par            CC: Osteoporosis, at [facility]:\par             2/1/2018 showed\par             spine T -3.2 down 17.2 % from 2005\par            .\par            54 yo with osteoporosis of the spine: T score -.3.2\par            All studies for metabolic contributors to osteoporosis were negative.\par            In February CTXs CTXs 1158\par            and repeat and by \par            7/5/2018 CTX s 697 suggesting good absorption of alendronate.\par            However, she developed heartburn and the alendronate was discontinued. . \par            .\par            Impression: ntire\par            Plan: Trial of Atelvia 35 mg after breakfast weekly. She will notify me if any symptoms develop. ROV in about 4 months.\par            Thank you. -shruthi\par            .

## 2021-11-03 ENCOUNTER — APPOINTMENT (OUTPATIENT)
Dept: ENDOCRINOLOGY | Facility: CLINIC | Age: 56
End: 2021-11-03
Payer: COMMERCIAL

## 2021-11-03 VITALS
SYSTOLIC BLOOD PRESSURE: 110 MMHG | DIASTOLIC BLOOD PRESSURE: 80 MMHG | OXYGEN SATURATION: 98 % | WEIGHT: 118 LBS | HEART RATE: 71 BPM | HEIGHT: 66 IN | BODY MASS INDEX: 18.96 KG/M2

## 2021-11-03 DIAGNOSIS — E55.9 VITAMIN D DEFICIENCY, UNSPECIFIED: ICD-10-CM

## 2021-11-03 PROCEDURE — 36415 COLL VENOUS BLD VENIPUNCTURE: CPT

## 2021-11-03 PROCEDURE — 99213 OFFICE O/P EST LOW 20 MIN: CPT | Mod: 25

## 2021-11-03 NOTE — HISTORY OF PRESENT ILLNESS
[FreeTextEntry1] : 2021    in person     [employer] has locked up during visit, IT consulted and repaired\par \par PCP: Dr. Gita guan [phone]\par          Ortho:  Dr. Gunnar Bear  for hand  \par            .\par CC: Osteoporosis, at [facility]/3/21 completed Tymlos and started Prolia \par             2018 showed\par             spine T -3.2 down 17.2 % from 2005\par         - arthritis of hands -   surg R hand 2016,  surg left hand 2019 - by Dr. Bear\par               it is called "basal joint arthritis".  \par \par Visit today to prepare for Prolia #2 which she needs to schedule for next month \par Did well with Prolia #1    \par \par Plan:  Prolia #2 in office after December 3;\par Schedule f/u visit here in April to prepare for Prolia #3  \par \par 2021    in person, primarily for Prolia #1.  Last Tymlos last night\par \par \par PCP: Dr. Gita guan [phone]\par          Ortho:  Dr. Gunnar Bear  for hand  \par            .\par CC: Osteoporosis, at [facility]:\par             2018 showed\par             spine T -3.2 down 17.2 % from \par         - arthritis of hands -   surg R hand 2016,  surg left hand 2019 - by Dr. Bear\par               it is called "basal joint arthritis".  \par \par Weight today by me 122 w/ shoes.   5 ft 5 in tall.\par She exercises considerably by walking, carrying 5 lb weights, using protein supplement to boost muscles and weight.\par Feels well\par Going on trip for a week.\par \par \par Injection provided w/o problem \par \par \par 2021    in person\par \par PCP: Dr. Gita guan [phone]\par          Ortho:  Dr. Gunnar Bear  for hand  \par            .\par CC: Osteoporosis, at [facility]:\par             2018 showed\par             spine T -3.2 down 17.2 % from \par         - arthritis of hands -   surg R hand 2016,  surg left hand 2019 - by Dr. Bear\par               it is called "basal joint arthritis".  \par \par \par Lost weight down to 113 lb, now heading back   She believes this was triggered by Tymlos.\par She needs to transition to an antiresorptive and Prolia would be of most benefit. \par \par \par \par \par \par \par Dec 15, 2020     videochat  [employer]\par \par PCP: Dr. Gita Feldman f [phone]\par          Ortho:  Dr. Gunnar Bear  for hand  \par            .\par CC: Osteoporosis, at [facility]:\par             2018 showed\par             spine T -3.2 down 17.2 % from \par         - arthritis of hands -   surg R hand 2016,  surg left hand 2019 - by Dr. Bear\par               it is called "basal joint arthritis".  \par \par Taking Tymlos, notes 6 lb weight loss and also\par notes rapid puls.\par She did not tolerate Fosamax in the past b/o heartburn.\par She has read online about side effects of Prolia and does not want to take that.\par \par Impression:  Doing very well.\par \par Plan:  Continue Tymlos through March.\par I have advised her to take Prolia, but she may end up agreeing to Reclast.  \par Long discussion about safety of Prolia and other option of Reclast (\par \par \par Sep 17, 2020      in person\par \par PCP: Dr. Gita guan [phone]\par          Ortho:  Dr. Gunnar Bear  for hand  \par            .\par CC: Osteoporosis, at [facility]:\par             2018 showed\par             spine T -3.2 down 17.2 % from 2005\par         - arthritis of hands -   surg R hand 2016,  surg left hand 2019 - by Dr. Bear\par               it is called "basal joint arthritis".  \par \par Started on Tymlos 2019.    Will aim to switch to Prolia as of May or 2021 at latest.  \par Most recent bone density at [facility] 2020 showed spine T score improved to -2.8, FN -1.8, TH -1.5\par \par \par \par \par \par Lab test on\par 2020 include\par TSH 0.87\par osteocalcin 43\par CTXs 877\par 25 OH vitamin D 38 b\par \par \par \par 2020\par \par This is a 21+ minute Tele-Phonic encounter with an established patient which was initiated by the patient during a time scheduled for a visit and the patient is aware that this may be a billable encounter.  The patient has not seen a provider of my specialty (Endocrinology) within out group in the past 7 days and this encounter is not anticipated to result in a scheduled in-person visit within he next 7 days.\par The reason for this Tele-Phonic encounter is listed below under "CC:"\par Verbal consent was discussed and obtained from the patient for this visit:  "You have chosen to receive care through the use of tele-media or telephone.   This enables health care providers at different locations to provide safe, effective, and convenient care through the use of technology.  Please note this is a billable encounter.  As with any health care service, there are risks associated with the use of tele-media or telephone, including equipment failure, poor image and/or resolution, and  issues.  You understand that I cannot physically examine you and that you may need to come to the office to complete the assessment.\par \par Patient agreed verbally to understanding the risks, benefits, alternatives of Tele-Media and telephone as explained.  All questions regarding tele-media and telephone encounters were answered. \par \par PCP: Dr. Gita Feldman f [phone]\par          Ortho:  Dr. Gunnar Bear  for hand  \par            .\par            CC: Osteoporosis, at [facility]:\par             2018 showed\par             spine T -3.2 down 17.2 % from \par \par CC: Osteoporosis, at [facility]:\par             2018 showed\par             spine T -3.2 down 17.2 % from 2005\par         - arthritis of hands -   surg R hand ,  surg left hand  - by Dr. Bear\par               it is called "basal joint arthritis".  \par \par Taking Tymlos without difficulty  \par Bone denshowed considerable improvement in spine, T -3.2 to -2.8\par \par Labs done at [facility] via Dr. Gita Feldman [facility] fax  included\par osteocalcin 41 (8-32) -Tymlos effect\par WBC 3.9\par Hct 37.7 \par glucose 94\par calcium 10.0\par albumn 4.3 \par alk phos 84 \par vit D 42\par TSH 0.86\par B12 850\par \par Impression:  Doing very well.   \par Tymlos renewed.\par \par \par \par 2019\par  PCP: Dr. Gita guan [phone]\par          Ortho:  Dr. Gunnar Bear  for hand  \par            .\par            CC: Osteoporosis, at [facility]:\par             2018 showed\par             spine T -3.2 down 17.2 % from 2005\par \par CC: Osteoporosis, at [facility]:\par             2018 showed\par             spine T -3.2 down 17.2 % from 2005\par         - arthritis of hands -   surg R hand 2016,  surg left hand  - by Dr. Bear\par               it is called "basal joint arthritis".  \par \par Taking Tymlos without difficulty  \par Next BD can be after 2020 \par            \par \par Started on Tymlos 2019 after cleared with her orthopedic doctor.\par \par Currently out of work.   2\par \par   \par            .\par \par \par \par \par \par \par 2019\par  PCP: Dr. 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  \par To start on Tymlos.\par Visits for instruction and to plan follow up.  \par \par Plan:  BMP etc in 3 weeks and in 4 months (October) and ROV November.  \par            .\par \par \par \par May 17, 2019\par \par 55 yo with severe osteoporosis of the spine.   She did not tolerate oral bisphosphonates.\par She should receive an anabolic.\par Will request Tymlos 80 mg daily.\par Her [employer] prescription plan defers to the [facility] at \par \par \par \par Has [facility] via [facility]  Rx BIN [id number]\par RxPCN [id number]\par RxGRP [id number]\par ID [id number] via Karthik Burch her        phone [phone]    \par \par To [facility] for X-ray spine \par \par \par \par Previous notes from [employer] appended below.\par \par   2018\par            .\par            PCP: Dr. Gita guan [phone]\par            .\par            CC: Osteoporosis, at [facility]:\par             2018 showed\par             spine T -3.2 down 17.2 % from \par            .\par            52 yo with osteoporosis of spine, T score - 3.2\par            CTXs over 1000, started on alendronate, but developed severe heartburn so switched to delayed release Atelvia, but with similar symptoms.\par            .\par            Impression: Markers of bone turnover showed she was absorbing and responding to medication; however, she does not tolerate oral antiresorptive medication.\par            .\par            Plan: Needs parenteral treatment. For spine T -3.2, at this point, it would make sense to treat with anabolic (Forteo or Tymlos) for 1 1/2 - 2 years followed by Prolia for as long as needed and then completing cycle with Reclast. She will think about this. \par            .==\par            .\par            2018\par            .\par            PCP: Dr. Gita guan [phone]\par            .\par            CC: Osteoporosis, at [facility]:\par             2018 showed\par             spine T -3.2 down 17.2 % from 2005\par            .\par            52 yo with osteoporosis of the spine: T score -.3.2\par            All studies for metabolic contributors to osteoporosis were negative.\par            In February CTXs CTXs 1158\par            and repeat and by \par            2018 CTX s 697 suggesting good absorption of alendronate.\par            However, she developed heartburn and the alendronate was discontinued. . \par            .\par            Impression: ntire\par            Plan: Trial of Atelvia 35 mg after breakfast weekly. She will notify me if any symptoms develop. ROV in about 4 months.\par            Thank you. -jh\par            .

## 2021-11-10 LAB
25(OH)D3 SERPL-MCNC: 40.5 NG/ML
ANION GAP SERPL CALC-SCNC: 13 MMOL/L
BUN SERPL-MCNC: 9 MG/DL
CALCIUM SERPL-MCNC: 9.8 MG/DL
CHLORIDE SERPL-SCNC: 104 MMOL/L
CO2 SERPL-SCNC: 26 MMOL/L
CREAT SERPL-MCNC: 0.54 MG/DL
GLUCOSE SERPL-MCNC: 87 MG/DL
POTASSIUM SERPL-SCNC: 4 MMOL/L
SODIUM SERPL-SCNC: 143 MMOL/L

## 2021-11-30 ENCOUNTER — APPOINTMENT (OUTPATIENT)
Dept: ENDOCRINOLOGY | Facility: CLINIC | Age: 56
End: 2021-11-30

## 2021-11-30 ENCOUNTER — APPOINTMENT (OUTPATIENT)
Dept: ENDOCRINOLOGY | Facility: CLINIC | Age: 56
End: 2021-11-30
Payer: COMMERCIAL

## 2021-11-30 VITALS
HEIGHT: 66 IN | DIASTOLIC BLOOD PRESSURE: 78 MMHG | HEART RATE: 86 BPM | BODY MASS INDEX: 18.8 KG/M2 | WEIGHT: 117 LBS | OXYGEN SATURATION: 99 % | SYSTOLIC BLOOD PRESSURE: 118 MMHG

## 2021-11-30 PROCEDURE — 96401 CHEMO ANTI-NEOPL SQ/IM: CPT

## 2021-11-30 RX ORDER — DENOSUMAB 60 MG/ML
60 INJECTION SUBCUTANEOUS
Qty: 1 | Refills: 0 | Status: COMPLETED | OUTPATIENT
Start: 2021-11-30

## 2021-11-30 RX ADMIN — DENOSUMAB 0 MG/ML: 60 INJECTION SUBCUTANEOUS at 00:00

## 2022-04-07 ENCOUNTER — APPOINTMENT (OUTPATIENT)
Dept: ENDOCRINOLOGY | Facility: CLINIC | Age: 57
End: 2022-04-07
Payer: COMMERCIAL

## 2022-04-07 PROCEDURE — 99443: CPT | Mod: 95

## 2022-04-11 ENCOUNTER — NON-APPOINTMENT (OUTPATIENT)
Age: 57
End: 2022-04-11

## 2022-04-15 NOTE — HISTORY OF PRESENT ILLNESS
[Other Location: e.g. School (Enter Location, City,State)___] : at [unfilled], at the time of the visit. [Medical Office: ([facility])___] : at the medical office located in  [Verbal consent obtained from patient] : the patient, [unfilled] [FreeTextEntry1] : 2022      \par \par PCP: Dr. Gita Feldman f [phone]\par          Ortho:  Dr. Gunnar Bear  for hand  \par            .\par CC: Osteoporosis, at [facility]/3/21 completed Tymlos and started Prolia \par             2018 bone density  showed     spine T -3.2 down 17.2 % from 2005\par             2020  bone density showed   spine T -2.8      TH -1.5   FN -1.8\par             3/4/2022 bone density showed      spine T -2.1 **, TH -1.0;  FN -1.7   X-ray spine:  no compressions   \par         - arthritis of hands -   surg R hand ,  surg left hand  - by Dr. Bear\par               it is called "basal joint arthritis". \par \par Most recent Prolia injection at 2021 visit.  \par Since last visit, noted some interval back pain, but that resolved and she feels fine now.  \par She went for bone density and x-ray spine at [facility].  She has a copy of the report and says her PCP also received\par a copy of the report.   I do not have that yet.\par She is interested to find out if she can receive her Prolia injections closer to her home in [facility] [place] and explains that her pharmacy told her that I would need to call.\par We will complete the visit later today, around 1:00 pm after the BD and X-ray spine arrive.    \par \par .\par .\par X-ray spine and bone density from [facility] have arrived.   After 2 years of Forteo and then Prolia, Spine T -2.1 from -3.2 prior to Rx.    \par \par Impression:  Bone density is at goal.  She can be treated with Reclast.   \par \par \par 2021    in person     [employer] has locked up during visit, IT consulted and repaired\par \par PCP: Dr. Gita guan [phone]\par          Ortho:  Dr. Gunnar Bear  for hand  \par            .\par CC: Osteoporosis, at [facility]/3/21 completed Tymlos and started Prolia \par             2018 showed\par             spine T -3.2 down 17.2 % from \par         - arthritis of hands -   surg R hand 2016,  surg left hand 2019 - by Dr. Bear\par               it is called "basal joint arthritis".  \par \par Visit today to prepare for Prolia #2 which she needs to schedule for next month \par Did well with Prolia #1    \par \par Plan:  Prolia #2 in office after December 3;\par Schedule f/u visit here in April to prepare for Prolia #3  \par \par 2021    in person, primarily for Prolia #1.  Last Tymlos last night\par \par \par PCP: Dr. Gita guan [phone]\par          Ortho:  Dr. Gunnar Bear  for hand  \par            .\par CC: Osteoporosis, at [facility]:\par             2018 showed\par             spine T -3.2 down 17.2 % from \par         - arthritis of hands -   surg R hand 2016,  surg left hand 2019 - by Dr. Bear\par               it is called "basal joint arthritis".  \par \par Weight today by me 122 w/ shoes.   5 ft 5 in tall.\par She exercises considerably by walking, carrying 5 lb weights, using protein supplement to boost muscles and weight.\par Feels well\par Going on trip for a week.\par \par \par Injection provided w/o problem \par \par \par 2021    in person\par \par PCP: Dr. Gita Feldman f [phone]\par          Ortho:  Dr. Gunnar Bear  for hand  \par            .\par CC: Osteoporosis, at [facility]:\par             2018 showed\par             spine T -3.2 down 17.2 % from \par         - arthritis of hands -   surg R hand 2016,  surg left hand 2019 - by Dr. Bear\par               it is called "basal joint arthritis".  \par \par \par Lost weight down to 113 lb, now heading back   She believes this was triggered by Tymlos.\par She needs to transition to an antiresorptive and Prolia would be of most benefit. \par \par \par \par \par \par \par Dec 15, 2020     videochat  [employer]\par \par PCP: Dr. Gita guan [phone]\par          Ortho:  Dr. Gunnar Bear  for hand  \par            .\par CC: Osteoporosis, at [facility]:\par             2018 showed\par             spine T -3.2 down 17.2 % from 2005\par         - arthritis of hands -   surg R hand ,  surg left hand  - by Dr. Bear\par               it is called "basal joint arthritis".  \par \par Taking Tymlos, notes 6 lb weight loss and also\par notes rapid puls.\par She did not tolerate Fosamax in the past b/o heartburn.\par She has read online about side effects of Prolia and does not want to take that.\par \par Impression:  Doing very well.\par \par Plan:  Continue Tymlos through March.\par I have advised her to take Prolia, but she may end up agreeing to Reclast.  \par Long discussion about safety of Prolia and other option of Reclast (\par \par \par Sep 17, 2020      in person\par \par PCP: Dr. Gita guan [phone]\par          Ortho:  Dr. Gunnar Bear  for hand  \par            .\par CC: Osteoporosis, at [facility]:\par             2018 showed\par             spine T -3.2 down 17.2 % from 2005\par         - arthritis of hands -   surg R hand ,  surg left hand  - by Dr. Bear\par               it is called "basal joint arthritis".  \par \par Started on Tymlos 2019.    Will aim to switch to Prolia as of May or 2021 at latest.  \par Most recent bone density at [facility] 2020 showed spine T score improved to -2.8, FN -1.8, TH -1.5\par \par \par \par \par \par Lab test on\par 2020 include\par TSH 0.87\par osteocalcin 43\par CTXs 877\par 25 OH vitamin D 38 b\par \par \par \par 2020\par \par This is a 21+ minute Tele-Phonic encounter with an established patient which was initiated by the patient during a time scheduled for a visit and the patient is aware that this may be a billable encounter.  The patient has not seen a provider of my specialty (Endocrinology) within out group in the past 7 days and this encounter is not anticipated to result in a scheduled in-person visit within he next 7 days.\par The reason for this Tele-Phonic encounter is listed below under "CC:"\par Verbal consent was discussed and obtained from the patient for this visit:  "You have chosen to receive care through the use of tele-media or telephone.   This enables health care providers at different locations to provide safe, effective, and convenient care through the use of technology.  Please note this is a billable encounter.  As with any health care service, there are risks associated with the use of tele-media or telephone, including equipment failure, poor image and/or resolution, and  issues.  You understand that I cannot physically examine you and that you may need to come to the office to complete the assessment.\par \par Patient agreed verbally to understanding the risks, benefits, alternatives of Tele-Media and telephone as explained.  All questions regarding tele-media and telephone encounters were answered. \par \par PCP: Dr. Gita Feldman  [phone]\par          Ortho:  Dr. Gunnar Bear  for hand  \par            .\par            CC: Osteoporosis, at [facility]:\par             2018 showed\par             spine T -3.2 down 17.2 % from 2005\par \par CC: Osteoporosis, at [facility]:\par             2018 showed\par             spine T -3.2 down 17.2 % from 2005\par         - arthritis of hands -   surg R hand 2016,  surg left hand 2019 - by Dr. Bear\par               it is called "basal joint arthritis".  \par \par Taking Tymlos without difficulty  \par Bone denshowed considerable improvement in spine, T -3.2 to -2.8\par \par Labs done at [facility] via Dr. Gita Feldman [facility] fax  included\par osteocalcin 41 (8-32) -Tymlos effect\par WBC 3.9\par Hct 37.7 \par glucose 94\par calcium 10.0\par albumn 4.3 \par alk phos 84 \par vit D 42\par TSH 0.86\par B12 850\par \par Impression:  Doing very well.   \par Tymlos renewed.\par \par \par \par 2019\par  PCP: Dr. Gita guan [phone]\par          Ortho:  Dr. Gunnar Bear  for hand  \par            .\par            CC: Osteoporosis, at [facility]:\par             2018 showed\par             spine T -3.2 down 17.2 % from 2005\par \par CC: Osteoporosis, at [facility]:\par             2018 showed\par             spine T -3.2 down 17.2 % from 2005\par         - arthritis of hands -   surg R hand ,  surg left hand  - by Dr. Bear\par               it is called "basal joint arthritis".  \par \par Taking Tymlos without difficulty  \par Next BD can be after 2020 \par            \par \par Started on Tymlos 2019 after cleared with her orthopedic doctor.\par \par Currently out of work.   2\par \par   \par            .\par \par \par \par \par \par \par 2019\par  PCP: Dr. 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  \par To start on Tymlos.\par Visits for instruction and to plan follow up.  \par \par Plan:  BMP etc in 3 weeks and in 4 months (October) and ROV November.  \par            .\par \par \par \par May 17, 2019\par \par 55 yo with severe osteoporosis of the spine.   She did not tolerate oral bisphosphonates.\par She should receive an anabolic.\par Will request Tymlos 80 mg daily.\par Her [facility] prescription plan defers to the [facility] at \par \par \par \par Has [facility] via [place] [facility]  Rx BIN [id number]\par RxPCN [id number]\par RxGRP [id number]\par ID [id number] via Karthik Burch her        phone [phone]    \par \par To [facility] for X-ray spine \par \par \par \par Previous notes from [employer] appended below.\par \par   2018\par            .\par            PCP: Dr. Gita guan [phone]\par            .\par            CC: Osteoporosis, at [facility]:\par             2018 showed\par             spine T -3.2 down 17.2 % from 2005\par            .\par            52 yo with osteoporosis of spine, T score - 3.2\par            CTXs over 1000, started on alendronate, but developed severe heartburn so switched to delayed release Atelvia, but with similar symptoms.\par            .\par            Impression: Markers of bone turnover showed she was absorbing and responding to medication; however, she does not tolerate oral antiresorptive medication.\par            .\par            Plan: Needs parenteral treatment. For spine T -3.2, at this point, it would make sense to treat with anabolic (Forteo or Tymlos) for 1 1/2 - 2 years followed by Prolia for as long as needed and then completing cycle with Reclast. She will think about this. \par            .==\par            .\par            2018\par            .\par            PCP: Dr. Gita guan [phone]\par            .\par            CC: Osteoporosis, at [facility]:\par             2018 showed\par             spine T -3.2 down 17.2 % from 2005\par            .\par            52 yo with osteoporosis of the spine: T score -.3.2\par            All studies for metabolic contributors to osteoporosis were negative.\par            In February CTXs CTXs 1158\par            and repeat and by \par            2018 CTX s 697 suggesting good absorption of alendronate.\par            However, she developed heartburn and the alendronate was discontinued. . \par            .\par            Impression: ntire\par            Plan: Trial of Atelvia 35 mg after breakfast weekly. She will notify me if any symptoms develop. ROV in about 4 months.\par            Thank you. -\par            .

## 2022-07-07 ENCOUNTER — NON-APPOINTMENT (OUTPATIENT)
Age: 57
End: 2022-07-07

## 2023-03-21 ENCOUNTER — APPOINTMENT (OUTPATIENT)
Dept: ENDOCRINOLOGY | Facility: CLINIC | Age: 58
End: 2023-03-21
Payer: COMMERCIAL

## 2023-03-21 VITALS
SYSTOLIC BLOOD PRESSURE: 120 MMHG | DIASTOLIC BLOOD PRESSURE: 84 MMHG | BODY MASS INDEX: 18.48 KG/M2 | HEART RATE: 66 BPM | WEIGHT: 115 LBS | OXYGEN SATURATION: 98 % | HEIGHT: 66 IN

## 2023-03-21 PROCEDURE — 99214 OFFICE O/P EST MOD 30 MIN: CPT

## 2023-03-21 NOTE — HISTORY OF PRESENT ILLNESS
[FreeTextEntry1] : Mar 21, 2023    in person\par \par PCP: Dr. Gita Feldman f [phone]\par          Ortho:  Dr. Gunnar Bear  for hand  \par            .\par CC: Osteoporosis, at [facility]/3/21 completed Tymlos and started Prolia \par             2018 bone density  showed     spine T -3.2 down 17.2 % from 2005\par             2020  bone density showed   spine T -2.8      TH -1.5   FN -1.8\par             3/4/2022 bone density showed      spine T -2.1 **, TH -1.0;  FN -1.7   X-ray spine:  no compressions   \par         - arthritis of hands -   surg R hand ,  surg left hand  - by Dr. Bear\par               it is called "basal joint arthritis". \par \par Most recent Prolia injection at 2021 visit.  \par Reclast was 2022\par \par More recently had Covid - lost some weight, but is gaining it back.\par She underwent endoscopy and colonoscopy at [facility]  (she has Hx of GERD)\par Put on 40 mg omeprazole with benefit.   \par  [facility] labs included \par vit D 34 \par \par : :\par Constitutional:  Alert, well nourished, healthy appearance, no acute distress \par Eyes:  No proptosis, no stare\par Thyroid:\par Pulmonary:  No respiratory distress, no accessory muscle use; normal rate and effort\par Cardiac:  Normal rate\par Vascular: \par Endocrine:  No stigmata of Cushing’s Syndrome\par Musculoskeletal:  Normal gait, no involuntary movements\par Neurology:  No tremors\par Affect/Mood/Psych:  Oriented x 3; normal affect, normal insight/judgement, normal mood \par .\par \par Impression:  She has had an excellent iimprovement in bone density.\par She will be eligible for Reclast #2 in 2023.\par Recent [facility] labs in good range.\par She did have leg cramps after Reclast #1 and she is aware that fever, arthralgias, bone pain are other potential side effects.\par \par Plan:  She will schedule reclast #2 for some time in  = Take prophylactic Tylenol - ROV six months later after lab tests.\par She has my cell phone for any issues.  \par \par \par \par \par 2022                                         \par \par PCP: Dr. Gita guan [phone]\par          Ortho:  Dr. Gunnar Bear  for hand  \par            .\par CC: Osteoporosis, at [facility]/3/21 completed Tymlos and started Prolia \par             2018 bone density  showed     spine T -3.2 down 17.2 % from 2005\par             2020  bone density showed   spine T -2.8      TH -1.5   FN -1.8\par             3/4/2022 bone density showed      spine T -2.1 **, TH -1.0;  FN -1.7   X-ray spine:  no compressions   \par         - arthritis of hands -   surg R hand ,  surg left hand  - by Dr. Bear\par               it is called "basal joint arthritis". \par \par Most recent Prolia injection at 2021 visit.  \par Since last visit, noted some interval back pain, but that resolved and she feels fine now.  \par She went for bone density and x-ray spine at [facility].  She has a copy of the report and says her PCP also received\par a copy of the report.   I do not have that yet.\par She is interested to find out if she can receive her Prolia injections closer to her [place] in [place] [place] and explains that her pharmacy told her that I would need to call.\par We will complete the visit later today, around 1:00 pm after the BD and X-ray spine arrive.    \par \par .\par .\par X-ray spine and bone density from [facility] have arrived.   After 2 years of Forteo and then Prolia, Spine T -2.1 from -3.2 prior to Rx.    \par \par Impression:  Bone density is at goal.  She can be treated with Reclast.   \par \par \par 2021    in person     [employer] has locked up during visit, IT consulted and repaired\par \par PCP: Dr. Gita guan [phone]\par          Ortho:  Dr. Gunnar Bear  for hand  \par            .\par CC: Osteoporosis, at [facility]/3/21 completed Tymlos and started Prolia \par             2018 showed\par             spine T -3.2 down 17.2 % from 2005\par         - arthritis of hands -   surg R hand 2016,  surg left hand 2019 - by Dr. Bear\par               it is called "basal joint arthritis".  \par \par Visit today to prepare for Prolia #2 which she needs to schedule for next month \par Did well with Prolia #1    \par \par Plan:  Prolia #2 in office after December 3;\par Schedule f/u visit here in April to prepare for Prolia #3  \par \par 2021    in person, primarily for Prolia #1.  Last Tymlos last night\par \par \par PCP: Dr. Gita guan [phone]\par          Ortho:  Dr. Gunnar Bear  for hand  \par            .\par CC: Osteoporosis, at [facility]:\par             2018 showed\par             spine T -3.2 down 17.2 % from \par         - arthritis of hands -   surg R hand 2016,  surg left hand 2019 - by Dr. Bear\par               it is called "basal joint arthritis".  \par \par Weight today by me 122 w/ shoes.   5 ft 5 in tall.\par She exercises considerably by walking, carrying 5 lb weights, using protein supplement to boost muscles and weight.\par Feels well\par Going on trip for a week.\par \par \par Injection provided w/o problem \par \par \par 2021    in person\par \par PCP: Dr. Gita guan [phone]\par          Ortho:  Dr. Gunnar Bear  for hand  \par            .\par CC: Osteoporosis, at [facility]:\par             2018 showed\par             spine T -3.2 down 17.2 % from 2005\par         - arthritis of hands -   surg R hand 2016,  surg left hand 2019 - by Dr. Bear\par               it is called "basal joint arthritis".  \par \par \par Lost weight down to 113 lb, now heading back   She believes this was triggered by Tymlos.\par She needs to transition to an antiresorptive and Prolia would be of most benefit. \par \par \par \par \par \par \par Dec 15, 2020     videochat  [employer]\par \par PCP: Dr. Gita guan [phone]\par          Ortho:  Dr. Gunnar Bear  for hand  \par            .\par CC: Osteoporosis, at [facility]:\par             2018 showed\par             spine T -3.2 down 17.2 % from 2005\par         - arthritis of hands -   surg R hand 2016,  surg left hand  - by Dr. Bear\par               it is called "basal joint arthritis".  \par \par Taking Tymlos, notes 6 lb weight loss and also\par notes rapid puls.\par She did not tolerate Fosamax in the past b/o heartburn.\par She has read online about side effects of Prolia and does not want to take that.\par \par Impression:  Doing very well.\par \par Plan:  Continue Tymlos through March.\par I have advised her to take Prolia, but she may end up agreeing to Reclast.  \par Long discussion about safety of Prolia and other option of Reclast (\par \par \par Sep 17, 2020      in person\par \par PCP: Dr. Gita guan [phone]\par          Ortho:  Dr. Gunnar Bear  for hand  \par            .\par CC: Osteoporosis, at [facility]:\par             2018 showed\par             spine T -3.2 down 17.2 % from 2005\par         - arthritis of hands -   surg R hand 2016,  surg left hand  - by Dr. Bear\par               it is called "basal joint arthritis".  \par \par Started on Tymlos 2019.    Will aim to switch to Prolia as of May or 2021 at latest.  \par Most recent bone density at [facility] 2020 showed spine T score improved to -2.8, FN -1.8, TH -1.5\par \par \par \par \par \par Lab test on\par 2020 include\par TSH 0.87\par osteocalcin 43\par CTXs 877\par 25 OH vitamin D 38 b\par \par \par \par 2020\par \par This is a 21+ minute Tele-Phonic encounter with an established patient which was initiated by the patient during a time scheduled for a visit and the patient is aware that this may be a billable encounter.  The patient has not seen a provider of my specialty (Endocrinology) within out group in the past 7 days and this encounter is not anticipated to result in a scheduled in-person visit within he next 7 days.\par The reason for this Tele-Phonic encounter is listed below under "CC:"\par Verbal consent was discussed and obtained from the patient for this visit:  "You have chosen to receive care through the use of tele-media or telephone.   This enables health care providers at different locations to provide safe, effective, and convenient care through the use of technology.  Please note this is a billable encounter.  As with any health care service, there are risks associated with the use of tele-media or telephone, including equipment failure, poor image and/or resolution, and  issues.  You understand that I cannot physically examine you and that you may need to come to the office to complete the assessment.\par \par Patient agreed verbally to understanding the risks, benefits, alternatives of Tele-Media and telephone as explained.  All questions regarding tele-media and telephone encounters were answered. \par \par PCP: Dr. Gita guan [phone]\par          Ortho:  Dr. Gunnar Bear  for hand  \par            .\par            CC: Osteoporosis, at [facility]:\par             2018 showed\par             spine T -3.2 down 17.2 % from 2005\par \par CC: Osteoporosis, at [facility]:\par             2018 showed\par             spine T -3.2 down 17.2 % from 2005\par         - arthritis of hands -   surg R hand ,  surg left hand  - by Dr. Bear\par               it is called "basal joint arthritis".  \par \par Taking Tymlos without difficulty  \par Bone denshowed considerable improvement in spine, T -3.2 to -2.8\par \par Labs done at [facility] via Dr. Gita Feldman [facility] fax  included\par osteocalcin 41 (8-32) -Tymlos effect\par WBC 3.9\par Hct 37.7 \par glucose 94\par calcium 10.0\par albumn 4.3 \par alk phos 84 \par vit D 42\par TSH 0.86\par B12 850\par \par Impression:  Doing very well.   \par Tymlos renewed.\par \par \par \par 2019\par  PCP: Dr. Gita guan [phone]\par          Ortho:  Dr. Gunnar Bear  for hand  \par            .\par            CC: Osteoporosis, at [facility]:\par             2018 showed\par             spine T -3.2 down 17.2 % from 2005\par \par CC: Osteoporosis, at [facility]:\par             2018 showed\par             spine T -3.2 down 17.2 % from 2005\par         - arthritis of hands -   surg R hand ,  surg left hand  - by Dr. Bear\par               it is called "basal joint arthritis".  \par \par Taking Tymlos without difficulty  \par Next BD can be after 2020 \par            \par \par Started on Tymlos 2019 after cleared with her orthopedic doctor.\par \par Currently out of work.   2\par \par   \par            .\par \par \par \par \par \par \par 2019\par  PCP: Dr. 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  \par To start on Tymlos.\par Visits for instruction and to plan follow up.  \par \par Plan:  BMP etc in 3 weeks and in 4 months (October) and ROV November.  \par            .\par \par \par \par May 17, 2019\par \par 55 yo with severe osteoporosis of the spine.   She did not tolerate oral bisphosphonates.\par She should receive an anabolic.\par Will request Tymlos 80 mg daily.\par Her [facility] plan defers to [facility] \par \par \par \par Has [facility] via [facility]  Rx BIN [id number]\par RxPCN [id number]\par RxGRP [id number]\par ID [id number] via Karthik Burch her        phone [phone]    \par \par To [facility] for X-ray spine \par \par \par \par Previous notes from [employer] appended below.\par \par   2018\par            .\par            PCP: Dr. Gita guan [phone]\par            .\par            CC: Osteoporosis, at [facility]:\par             2018 showed\par             spine T -3.2 down 17.2 % from 2005\par            .\par            54 yo with osteoporosis of spine, T score - 3.2\par            CTXs over 1000, started on alendronate, but developed severe heartburn so switched to delayed release Atelvia, but with similar symptoms.\par            .\par            Impression: Markers of bone turnover showed she was absorbing and responding to medication; however, she does not tolerate oral antiresorptive medication.\par            .\par            Plan: Needs parenteral treatment. For spine T -3.2, at this point, it would make sense to treat with anabolic (Forteo or Tymlos) for 1 1/2 - 2 years followed by Prolia for as long as needed and then completing cycle with Reclast. She will think about this. \par            .==\par            .\par            2018\par            .\par            PCP: Dr. Gita guan [phone]\par            .\par            CC: Osteoporosis, at [facility]:\par             2018 showed\par             spine T -3.2 down 17.2 % from 2005\par            .\par            54 yo with osteoporosis of the spine: T score -.3.2\par            All studies for metabolic contributors to osteoporosis were negative.\par            In February CTXs CTXs 1158\par            and repeat and by \par            2018 CTX s 697 suggesting good absorption of alendronate.\par            However, she developed heartburn and the alendronate was discontinued. . \par            .\par            Impression: ntire\par            Plan: Trial of Atelvia 35 mg after breakfast weekly. She will notify me if any symptoms develop. ROV in about 4 months.\par            Thank you. -shruthi\par            .

## 2024-02-21 ENCOUNTER — APPOINTMENT (OUTPATIENT)
Dept: ENDOCRINOLOGY | Facility: CLINIC | Age: 59
End: 2024-02-21
Payer: COMMERCIAL

## 2024-02-21 VITALS
OXYGEN SATURATION: 99 % | BODY MASS INDEX: 18.8 KG/M2 | SYSTOLIC BLOOD PRESSURE: 108 MMHG | WEIGHT: 117 LBS | HEIGHT: 66 IN | HEART RATE: 85 BPM | DIASTOLIC BLOOD PRESSURE: 62 MMHG

## 2024-02-21 PROCEDURE — 99215 OFFICE O/P EST HI 40 MIN: CPT

## 2024-02-21 RX ORDER — ZOLEDRONIC ACID 5 MG/100ML
5 INJECTION, SOLUTION INTRAVENOUS
Refills: 0 | Status: ACTIVE | COMMUNITY

## 2024-02-21 RX ORDER — CALCIUM CARBONATE/VITAMIN D3 600 MG-10
TABLET ORAL
Refills: 0 | Status: DISCONTINUED | COMMUNITY
End: 2024-02-21

## 2024-02-21 RX ORDER — DENOSUMAB 60 MG/ML
60 INJECTION SUBCUTANEOUS
Qty: 1 | Refills: 0 | Status: DISCONTINUED | COMMUNITY
Start: 2021-06-10 | End: 2024-02-21

## 2024-02-21 RX ORDER — CYCLOSPORINE 0.5 MG/ML
0.05 EMULSION OPHTHALMIC
Refills: 0 | Status: DISCONTINUED | COMMUNITY
End: 2024-02-21

## 2024-02-21 RX ORDER — ABALOPARATIDE 2000 UG/ML
3120 INJECTION, SOLUTION SUBCUTANEOUS DAILY
Qty: 1 | Refills: 4 | Status: DISCONTINUED | COMMUNITY
Start: 2019-05-17 | End: 2024-02-21

## 2024-02-21 RX ORDER — OLOPATADINE HYDROCHLORIDE 2 MG/ML
0.2 SOLUTION OPHTHALMIC
Refills: 0 | Status: ACTIVE | COMMUNITY

## 2024-02-21 RX ORDER — CHOLECALCIFEROL (VITAMIN D3) 25 MCG
TABLET ORAL
Refills: 0 | Status: ACTIVE | COMMUNITY

## 2024-02-21 RX ORDER — MULTIVITAMIN,THERAPEUTIC
TABLET ORAL DAILY
Refills: 0 | Status: ACTIVE | COMMUNITY

## 2024-02-21 RX ORDER — DENOSUMAB 60 MG/ML
60 INJECTION SUBCUTANEOUS
Qty: 1 | Refills: 1 | Status: DISCONTINUED | COMMUNITY
Start: 2021-04-30 | End: 2024-02-21

## 2024-02-21 NOTE — HISTORY OF PRESENT ILLNESS
[FreeTextEntry1] : 2024    in person  PCP: Dr. Natalie Cha on 2/15/204 <- Dr. Gita Feldman f [phone]          Ortho:  Dr. Gunanr Bear  for hand              . CC: Osteoporosis, at [facility]/3/21 completed Tymlos and started Prolia              2018 bone density  showed     spine T -3.2 down 17.2 % from 2005             2020  bone density showed   spine T -2.8      TH -1.5   FN -1.8             3/4/2022 bone density showed      spine T -2.1 **, TH -1.0;  FN -1.7   X-ray spine:  no compressions            - arthritis of hands -   surg R hand ,  surg left hand  - by Dr. Bear               it is called "basal joint arthritis".   60 yo (with GERD oin omeprazole) returns to follow up on osteoporosis (2018 spine T -3.2) treated with ~2 years Tymlos, completed 2021 and then Prolia, and trnsitioned to Reclast  was 2022  (leg cramps) and tjem Reclast #2 ~ 2023.completed  Most recnt BD 3/2022 spine T improved to -2.1 and she will go for next after 3/2024.  Recent labs 2024 included vit D 36  CTXs  534  Reclast was 2022  . She underwent endoscopy and colonoscopy at [facility]  (she has Hx of GERD - on omeprazole)  : : Constitutional:  Alert, well nourished, healthy appearance, no acute distress  Eyes:  No proptosis, no stare Thyroid: Pulmonary:  No respiratory distress, no accessory muscle use; normal rate and effort Cardiac:  Normal rate Vascular:  Endocrine:  No stigmata of Cushings Syndrome Musculoskeletal:  Normal gait, no involuntary movements Neurology:  No tremors Affect/Mood/Psych:  Oriented x 3; normal affect, normal insight/judgement, normal mood  . Impression:  Has had excellent response to Tymlos > Prolia > Reclast. Plan:  Holiday for now.  Next BD ~ 2024 and ROV ~ 10/2024.   Mar 21, 2023    in person  PCP: Dr. Gita Feldman f [phone]          Ortho:  Dr. Gunnar Bear  for hand              . CC: Osteoporosis, at [facility]/3/21 completed Tymlos and started Prolia              2018 bone density  showed     spine T -3.2 down 17.2 % from 2020  bone density showed   spine T -2.8      TH -1.5   FN -1.8             3/4/2022 bone density showed      spine T -2.1 **, TH -1.0;  FN -1.7   X-ray spine:  no compressions            - arthritis of hands -   surg R hand ,  surg left hand  - by Dr. Bear               it is called "basal joint arthritis".   Most recent Prolia injection at 2021 visit.   Reclast was 2022  More recently had Covid - lost some weight, but is gaining it back. She underwent endoscopy and colonoscopy at [facility]  (she has Hx of GERD) Put on 40 mg omeprazole with benefit.     [facility] labs included  vit D 34   : : Constitutional:  Alert, well nourished, healthy appearance, no acute distress  Eyes:  No proptosis, no stare Thyroid: Pulmonary:  No respiratory distress, no accessory muscle use; normal rate and effort Cardiac:  Normal rate Vascular:  Endocrine:  No stigmata of Cushing's Syndrome Musculoskeletal:  Normal gait, no involuntary movements Neurology:  No tremors Affect/Mood/Psych:  Oriented x 3; normal affect, normal insight/judgement, normal mood  .  Impression:  She has had an excellent iimprovement in bone density. She will be eligible for Reclast #2 in 2023. Recent [facility] labs in good range. She did have leg cramps after Reclast #1 and she is aware that fever, arthralgias, bone pain are other potential side effects.  Plan:  She will schedule reclast #2 for some time in  = Take prophylactic Tylenol - ROV six months later after lab tests. She has my cell phone for any issues.       2022                                           PCP: Dr. Gita guan [phone]          Ortho:  Dr. Gunnar Bear  for hand              . CC: Osteoporosis, at [facility]/3/21 completed Tymlos and started Prolia              2018 bone density  showed     spine T -3.2 down 17.2 % from 2020  bone density showed   spine T -2.8      TH -1.5   FN -1.8             3/4/2022 bone density showed      spine T -2.1 **, TH -1.0;  FN -1.7   X-ray spine:  no compressions            - arthritis of hands -   surg R hand ,  surg left hand  - by Dr. Bear               it is called "basal joint arthritis".   Most recent Prolia injection at 2021 visit.   Since last visit, noted some interval back pain, but that resolved and she feels fine now.   She went for bone density and x-ray spine at [facility].  She has a copy of the report and says her PCP also received a copy of the report.   I do not have that yet. She is interested to find out if she can receive her Prolia injections closer to her home in [place] and explains that her pharmacy told her that I would need to call. We will complete the visit later today, around 1:00 pm after the BD and X-ray spine arrive.      . . X-ray spine and bone density from [facility] have arrived.   After 2 years of Forteo and then Prolia, Spine T -2.1 from -3.2 prior to Rx.      Impression:  Bone density is at goal.  She can be treated with Reclast.      2021    in person     [employer] has locked up during visit, IT consulted and repaired  PCP: Dr. Gita guan [phone]          Ortho:  Dr. Gunnar Bear  for hand              . CC: Osteoporosis, at [facility]/3/21 completed Tymlos and started Prolia              2018 showed             spine T -3.2 down 17.2 % from 2005 - arthritis of hands -   surg R hand ,  surg left hand  - by Dr. Bear               it is called "basal joint arthritis".    Visit today to prepare for Prolia #2 which she needs to schedule for next month  Did well with Prolia #1      Plan:  Prolia #2 in office after December 3; Schedule f/u visit here in April to prepare for Prolia #3    2021    in person, primarily for Prolia #1.  Last Tymlos last night   PCP: Dr. Gita guan [phone]          Ortho:  Dr. Gunnar Bear  for hand              . CC: Osteoporosis, at [facility]/1/2018 showed             spine T -3.2 down 17.2 % from 2005 - arthritis of hands -   surg R hand 2016,  surg left hand 2019 - by Dr. Bear               it is called "basal joint arthritis".    Weight today by me 122 w/ shoes.   5 ft 5 in tall. She exercises considerably by walking, carrying 5 lb weights, using protein supplement to boost muscles and weight. Feels well Going on trip for a week.   Injection provided w/o problem    2021    in person  PCP: Dr. Gita guan [phone]          Ortho:  Dr. Gunnar Bear  for hand              . CC: Osteoporosis, at [facility]/1/2018 showed             spine T -3.2 down 17.2 % from 2005 - arthritis of hands -   surg R hand 2016,  surg left hand 2019 - by Dr. Bear               it is called "basal joint arthritis".     Lost weight down to 113 lb, now heading back   She believes this was triggered by Tymlos. She needs to transition to an antiresorptive and Prolia would be of most benefit.        Dec 15, 2020     videochat  [employer]  PCP: Dr. Gita guan [phone]          Ortho:  Dr. Gunnar Bear  for hand              . CC: Osteoporosis, at [facility]/1/2018 showed             spine T -3.2 down 17.2 % from 2005 - arthritis of hands -   surg R hand 2016,  surg left hand 2019 - by Dr. Bear               it is called "basal joint arthritis".    Taking Tymlos, notes 6 lb weight loss and also notes rapid puls. She did not tolerate Fosamax in the past b/o heartburn. She has read online about side effects of Prolia and does not want to take that.  Impression:  Doing very well.  Plan:  Continue Tymlos through March. I have advised her to take Prolia, but she may end up agreeing to Reclast.   Long discussion about safety of Prolia and other option of Reclast (   Sep 17, 2020      in person  PCP: Dr. Gita guan [phone]          Ortho:  Dr. Gunnar Bear  for hand              . CC: Osteoporosis, at [facility]/1/2018 showed             spine T -3.2 down 17.2 % from 2005 - arthritis of hands -   surg R hand ,  surg left hand  - by Dr. Bear               it is called "basal joint arthritis".    Started on Tymlos 2019.    Will aim to switch to Prolia as of May or 2021 at latest.   Most recent bone density at [facility] 2020 showed spine T score improved to -2.8, FN -1.8, TH -1.5      Lab test on 2020 include TSH 0.87 osteocalcin 43 CTXs 877 25 OH vitamin D 38 b    2020  This is a 21+ minute Tele-Phonic encounter with an established patient which was initiated by the patient during a time scheduled for a visit and the patient is aware that this may be a billable encounter.  The patient has not seen a provider of my specialty (Endocrinology) within out group in the past 7 days and this encounter is not anticipated to result in a scheduled in-person visit within he next 7 days. The reason for this Tele-Phonic encounter is listed below under "CC:" Verbal consent was discussed and obtained from the patient for this visit:  "You have chosen to receive care through the use of tele-media or telephone.   This enables health care providers at different locations to provide safe, effective, and convenient care through the use of technology.  Please note this is a billable encounter.  As with any health care service, there are risks associated with the use of tele-media or telephone, including equipment failure, poor image and/or resolution, and  issues.  You understand that I cannot physically examine you and that you may need to come to the office to complete the assessment.  Patient agreed verbally to understanding the risks, benefits, alternatives of Tele-Media and telephone as explained.  All questions regarding tele-media and telephone encounters were answered.   PCP: Dr. Gita guan [phone]          Ortho:  Dr. Gunnar Bear  for hand              .            CC: Osteoporosis, at [facility]/1/2018 showed             spine T -3.2 down 17.2 % from   CC: Osteoporosis, at [facility]/1/2018 showed             spine T -3.2 down 17.2 % from 2005 - arthritis of hands -   surg R hand 2016,  surg left hand 2019 - by Dr. Bear               it is called "basal joint arthritis".    Taking Tymlos without difficulty   Bone denshowed considerable improvement in spine, T -3.2 to -2.8  Labs done at [facility] via Dr. Gita Newton [facility] fax  included osteocalcin 41 (8-32) -Tymlos effect WBC 3.9 Hct 37.7  glucose 94 calcium 10.0 albumn 4.3  alk phos 84  vit D 42 TSH 0.86 B12 850  Impression:  Doing very well.    Tymlos renewed.    2019  PCP: Dr. Gita guan [phone]          Ortho:  Dr. Gunnar Bear  for hand              .            CC: Osteoporosis, at [facility]/1/2018 showed             spine T -3.2 down 17.2 % from   CC: Osteoporosis, at [facility]/1/2018 showed             spine T -3.2 down 17.2 % from 2005 - arthritis of hands -   surg R hand 2016,  surg left hand 2019 - by Dr. Bear               it is called "basal joint arthritis".    Taking Tymlos without difficulty   Next BD can be after 2020               Started on Tymlos 2019 after cleared with her orthopedic doctor.  Currently out of work.   2                .       2019  PCP: Dr. Gita guan [phone]            .            CC: Osteoporosis, at 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   To start on Tymlos. Visits for instruction and to plan follow up.    Plan:  BMP etc in 3 weeks and in 4 months (October) and ROV November.              .    May 17, 2019  53 yo with severe osteoporosis of the spine.   She did not tolerate oral bisphosphonates. She should receive an anabolic. Will request Tymlos 80 mg daily. Her [facility] prescription plan defers to the [facility] at     Has [facility] via [place] [facility]  Rx BIN [id number] RxPCN [id number] RxGRP [id number] ID [id number] via Karthik Burch her        phone [phone]      To [facility] for X-ray spine     Previous notes from [employer] appended below.    2018            .            PCP: Dr. Gita guan [phone]            .            CC: Osteoporosis, at [facility]/1/2018 showed             spine T -3.2 down 17.2 % from 2005            .            54 yo with osteoporosis of spine, T score - 3.2            CTXs over 1000, started on alendronate, but developed severe heartburn so switched to delayed release Atelvia, but with similar symptoms.            .            Impression: Markers of bone turnover showed she was absorbing and responding to medication; however, she does not tolerate oral antiresorptive medication.            .            Plan: Needs parenteral treatment. For spine T -3.2, at this point, it would make sense to treat with anabolic (Forteo or Tymlos) for 1 1/2 - 2 years followed by Prolia for as long as needed and then completing cycle with Reclast. She will think about this.             .==            .            2018            .            PCP: Dr. Gita guan [phone]            .            CC: Osteoporosis, at [facility]/1/2018 showed             spine T -3.2 down 17.2 % from 2005            .            54 yo with osteoporosis of the spine: T score -.3.2            All studies for metabolic contributors to osteoporosis were negative.            In February CTXs CTXs 1158            and repeat and by             2018 CTX s 697 suggesting good absorption of alendronate.            However, she developed heartburn and the alendronate was discontinued. .             .            Impression: ntire            Plan: Trial of Atelvia 35 mg after breakfast weekly. She will notify me if any symptoms develop. ROV in about 4 months.            Thank you. -shruthi            .

## 2024-04-05 ENCOUNTER — APPOINTMENT (OUTPATIENT)
Dept: ENDOCRINOLOGY | Facility: CLINIC | Age: 59
End: 2024-04-05
Payer: COMMERCIAL

## 2024-04-05 VITALS
SYSTOLIC BLOOD PRESSURE: 112 MMHG | WEIGHT: 117 LBS | OXYGEN SATURATION: 99 % | DIASTOLIC BLOOD PRESSURE: 62 MMHG | BODY MASS INDEX: 18.8 KG/M2 | HEIGHT: 66 IN | HEART RATE: 82 BPM

## 2024-04-05 DIAGNOSIS — M81.0 AGE-RELATED OSTEOPOROSIS W/OUT CURRENT PATHOLOGICAL FRACTURE: ICD-10-CM

## 2024-04-05 PROCEDURE — 99215 OFFICE O/P EST HI 40 MIN: CPT

## 2024-04-05 RX ORDER — OMEPRAZOLE 40 MG/1
40 CAPSULE, DELAYED RELEASE ORAL
Refills: 0 | Status: ACTIVE | COMMUNITY

## 2024-04-05 NOTE — HISTORY OF PRESENT ILLNESS
Occupational Therapy   Patient Not Seen    Cornelio Croft  MRN: [id number]    OT order received and acknowledged. Evaluation not completed today to allow for increased time to transition. Will follow-up on next available date pending medical status and need for therapy assessment.    [FreeTextEntry1] : 2024    in person  PCP: Dr. Natalie Cha on 2/15/204 <- Dr. Gita guan [phone]          Ortho:  Dr. Gunnar Bear  for hand              . CC: Osteoporosis, at [facility]/3/21 completed Tymlos and started Prolia              2018 bone density  showed     spine T -3.2 down 17.2 % from 2020  bone density showed   spine T -2.8      TH -1.5   FN -1.8                                 3/4/2022 bone density showed      spine T -2.1 **, TH -1.0;  FN -1.7   X-ray spine:  no compressions                3/2024   bone density spine T -2.5***         - arthritis of hands -   surg R hand ,  surg left hand  - by Dr. Bear               it is called "basal joint arthritis".   60 yo (with GERD oin omeprazole) returns to follow up on osteoporosis (2018 spine T -3.2) treated with ~2 years Tymlos, completed 2021 and then Prolia, and trnsitioned to Reclast  was 2022  (leg cramps) and tjem Reclast #2 ~ 2023.completed   BD 3/2022 spine T improved to -2.1 and she went for next after 3/2024. Recent labs 3/2024 included vit D 36  CTXs  534  Reclast #2 was 2022  . She underwent endoscopy and colonoscopy at [facility]  (she has Hx of GERD - on omeprazole)  : : Constitutional:  Alert, well nourished, healthy appearance, no acute distress  Eyes:  No proptosis, no stare Thyroid: Pulmonary:  No respiratory distress, no accessory muscle use; normal rate and effort Cardiac:  Normal rate Vascular:  Endocrine:  No stigmata of Cushings Syndrome Musculoskeletal:  Normal gait, no involuntary movements Neurology:  No tremors Affect/Mood/Psych:  Oriented x 3; normal affect, normal insight/judgement, normal mood  . Impression:  Has had excellent response to Tymlos > Prolia > Reclast.  However, spine bone density is now starting to backslide 8 months after Reclast #2 going down from -2.1 to -2.5    Moreover,  she had signficant side effects from the Reclast - diffuse bone pain and arthalgias.  Plan:  Ideally she will restart the Prolia every 6 months. PA will be started; [facility]   Continue calcium and vitamin D supplemtnts    Mar 21, 2023    in person  PCP: Dr. Gita guan [phone]          Ortho:  Dr. Gunnar Bear  for hand              . CC: Osteoporosis, at [facility]/3/21 completed Tymlos and started Prolia              2018 bone density  showed     spine T -3.2 down 17.2 % from 2020  bone density showed   spine T -2.8      TH -1.5   FN -1.8             3/4/2022 bone density showed      spine T -2.1 **, TH -1.0;  FN -1.7   X-ray spine:  no compressions            - arthritis of hands -   surg R hand ,  surg left hand  - by Dr. Bear               it is called "basal joint arthritis".   Most recent Prolia injection at 2021 visit.   Reclast was 2022  More recently had Covid - lost some weight, but is gaining it back. She underwent endoscopy and colonoscopy at [facility]  (she has Hx of GERD) Put on 40 mg omeprazole with benefit.     [facility] labs included  vit D 34   : : Constitutional:  Alert, well nourished, healthy appearance, no acute distress  Eyes:  No proptosis, no stare Thyroid: Pulmonary:  No respiratory distress, no accessory muscle use; normal rate and effort Cardiac:  Normal rate Vascular:  Endocrine:  No stigmata of Cushing's Syndrome Musculoskeletal:  Normal gait, no involuntary movements Neurology:  No tremors Affect/Mood/Psych:  Oriented x 3; normal affect, normal insight/judgement, normal mood  .  Impression:  She has had an excellent iimprovement in bone density. She will be eligible for Reclast #2 in 2023. Recent [facility] labs in good range. She did have leg cramps after Reclast #1 and she is aware that fever, arthralgias, bone pain are other potential side effects.  Plan:  She will schedule reclast #2 for some time in  = Take prophylactic Tylenol - ROV six months later after lab tests. She has my cell phone for any issues.       2022                                           PCP: Dr. Gita guan [phone]          Ortho:  Dr. Gunnar Bear  for hand              . CC: Osteoporosis, at [facility]/3/21 completed Tymlos and started Prolia              2018 bone density  showed     spine T -3.2 down 17.2 % from 2020  bone density showed   spine T -2.8      TH -1.5   FN -1.8             3/4/2022 bone density showed      spine T -2.1 **, TH -1.0;  FN -1.7   X-ray spine:  no compressions            - arthritis of hands -   surg R hand ,  surg left hand  - by Dr. Bear               it is called "basal joint arthritis".   Most recent Prolia injection at 2021 visit.   Since last visit, noted some interval back pain, but that resolved and she feels fine now.   She went for bone density and x-ray spine at [facility].  She has a copy of the report and says her PCP also received a copy of the report.   I do not have that yet. She is interested to find out if she can receive her Prolia injections closer to her home in [facility] and explains that her pharmacy told her that I would need to call. We will complete the visit later today, around 1:00 pm after the BD and X-ray spine arrive.      . . X-ray spine and bone density from [facility] have arrived.   After 2 years of Forteo and then Prolia, Spine T -2.1 from -3.2 prior to Rx.      Impression:  Bone density is at goal.  She can be treated with Reclast.      2021    in person     [employer] has locked up during visit, IT consulted and repaired  PCP: Dr. Gita guan [phone]          Ortho:  Dr. Gunnar Bear  for hand              . CC: Osteoporosis, at [facility]/3/21 completed Tymlos and started Prolia              2018 showed             spine T -3.2 down 17.2 % from 2005 - arthritis of hands -   surg R hand ,  surg left hand  - by Dr. Bear               it is called "basal joint arthritis".    Visit today to prepare for Prolia #2 which she needs to schedule for next month  Did well with Prolia #1      Plan:  Prolia #2 in office after December 3; Schedule f/u visit here in April to prepare for Prolia #3    2021    in person, primarily for Prolia #1.  Last Tymlos last night   PCP: Dr. Gita guan [phone]          Ortho:  Dr. Gunnar Bear  for hand              . CC: Osteoporosis, at [facility]/1/2018 showed             spine T -3.2 down 17.2 % from 2005 - arthritis of hands -   surg R hand 2016,  surg left hand 2019 - by Dr. Bear               it is called "basal joint arthritis".    Weight today by me 122 w/ shoes.   5 ft 5 in tall. She exercises considerably by walking, carrying 5 lb weights, using protein supplement to boost muscles and weight. Feels well Going on trip for a week.   Injection provided w/o problem    2021    in person  PCP: Dr. Gita guan [phone]          Ortho:  Dr. Gunnar Bear  for hand              . CC: Osteoporosis, at [facility]/1/2018 showed             spine T -3.2 down 17.2 % from 2005 - arthritis of hands -   surg R hand 2016,  surg left hand 2019 - by Dr. Bear               it is called "basal joint arthritis".     Lost weight down to 113 lb, now heading back   She believes this was triggered by Tymlos. She needs to transition to an antiresorptive and Prolia would be of most benefit.        Dec 15, 2020     videochat  [employer]  PCP: Dr. Gita guan [phone]          Ortho:  Dr. Gunnar Bear  for hand              . CC: Osteoporosis, at [facility]/1/2018 showed             spine T -3.2 down 17.2 % from 2005 - arthritis of hands -   surg R hand 2016,  surg left hand 2019 - by Dr. Bear               it is called "basal joint arthritis".    Taking Tymlos, notes 6 lb weight loss and also notes rapid puls. She did not tolerate Fosamax in the past b/o heartburn. She has read online about side effects of Prolia and does not want to take that.  Impression:  Doing very well.  Plan:  Continue Tymlos through March. I have advised her to take Prolia, but she may end up agreeing to Reclast.   Long discussion about safety of Prolia and other option of Reclast (   Sep 17, 2020      in person  PCP: Dr. Gita guan [phone]          Ortho:  Dr. Gunnar Bear  for hand              . CC: Osteoporosis, at [facility]/1/2018 showed             spine T -3.2 down 17.2 % from 2005 - arthritis of hands -   surg R hand 2016,  surg left hand 2019 - by Dr. Bear               it is called "basal joint arthritis".    Started on Tymlos 2019.    Will aim to switch to Prolia as of May or 2021 at latest.   Most recent bone density at [facility] 2020 showed spine T score improved to -2.8, FN -1.8, TH -1.5      Lab test on 2020 include TSH 0.87 osteocalcin 43 CTXs 877 25 OH vitamin D 38 b    2020  This is a 21+ minute Tele-Phonic encounter with an established patient which was initiated by the patient during a time scheduled for a visit and the patient is aware that this may be a billable encounter.  The patient has not seen a provider of my specialty (Endocrinology) within out group in the past 7 days and this encounter is not anticipated to result in a scheduled in-person visit within he next 7 days. The reason for this Tele-Phonic encounter is listed below under "CC:" Verbal consent was discussed and obtained from the patient for this visit:  "You have chosen to receive care through the use of tele-media or telephone.   This enables health care providers at different locations to provide safe, effective, and convenient care through the use of technology.  Please note this is a billable encounter.  As with any health care service, there are risks associated with the use of tele-media or telephone, including equipment failure, poor image and/or resolution, and  issues.  You understand that I cannot physically examine you and that you may need to come to the office to complete the assessment.  Patient agreed verbally to understanding the risks, benefits, alternatives of Tele-Media and telephone as explained.  All questions regarding tele-media and telephone encounters were answered.   PCP: Dr. Gita guan [phone]          Ortho:  Dr. Gunnar Bear  for hand              .            CC: Osteoporosis, at [facility]/1/2018 showed             spine T -3.2 down 17.2 % from   CC: Osteoporosis, at [facility]/1/2018 showed             spine T -3.2 down 17.2 % from 2005 - arthritis of hands -   surg R hand 2016,  surg left hand 2019 - by Dr. Bear               it is called "basal joint arthritis".    Taking Tymlos without difficulty   Bone denshowed considerable improvement in spine, T -3.2 to -2.8  Labs done at [facility] via Dr. Gita Feldman [facility] fax  included osteocalcin 41 (8-32) -Tymlos effect WBC 3.9 Hct 37.7  glucose 94 calcium 10.0 albumn 4.3  alk phos 84  vit D 42 TSH 0.86 B12 850  Impression:  Doing very well.    Tymlos renewed.    2019  PCP: Dr. Gita guan [phone]          Ortho:  Dr. Gunnar Bear  for hand              .            CC: Osteoporosis, at [facility]/1/2018 showed             spine T -3.2 down 17.2 % from   CC: Osteoporosis, at [facility]/1/2018 showed             spine T -3.2 down 17.2 % from 2005 - arthritis of hands -   surg R hand 2016,  surg left hand 2019 - by Dr. Bear               it is called "basal joint arthritis".    Taking Tymlos without difficulty   Next BD can be after 2020               Started on Tymlos 2019 after cleared with her orthopedic doctor.  Currently out of work.   2                .       2019  PCP: Dr. Gita guan [phone]            .            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   To start on Tymlos. Visits for instruction and to plan follow up.    Plan:  BMP etc in 3 weeks and in 4 months (October) and ROV November.              .    May 17, 2019  53 yo with severe osteoporosis of the spine.   She did not tolerate oral bisphosphonates. She should receive an anabolic. Will request Tymlos 80 mg daily. Her [facility] def[facility]     [facility]  Rx BIN [id number] RxPCN [id number] RxGRP [id number] ID [id number] via Karthik LYNETTE Burch her        phone [phone]      To [facility] for X-ray spine     Previous notes from [employer] appended below.    2018            .            PCP: Dr. Gita guan [phone]            .            CC: Osteoporosis, at [facility]/1/2018 showed             spine T -3.2 down 17.2 % from 2005            .            52 yo with osteoporosis of spine, T score - 3.2            CTXs over 1000, started on alendronate, but developed severe heartburn so switched to delayed release Atelvia, but with similar symptoms.            .            Impression: Markers of bone turnover showed she was absorbing and responding to medication; however, she does not tolerate oral antiresorptive medication.            .            Plan: Needs parenteral treatment. For spine T -3.2, at this point, it would make sense to treat with anabolic (Forteo or Tymlos) for 1 1/2 - 2 years followed by Prolia for as long as needed and then completing cycle with Reclast. She will think about this.             .==            .            2018            .            PCP: Dr. Gita guan [phone]            .            CC: Osteoporosis, at [facility]/1/2018 showed             spine T -3.2 down 17.2 % from 2005            .            52 yo with osteoporosis of the spine: T score -.3.2            All studies for metabolic contributors to osteoporosis were negative.            In February CTXs CTXs 1158            and repeat and by             2018 CTX s 697 suggesting good absorption of alendronate.            However, she developed heartburn and the alendronate was discontinued. .             .            Impression: ntire            Plan: Trial of Atelvia 35 mg after breakfast weekly. She will notify me if any symptoms develop. ROV in about 4 months.            Thank you. -shruthi            .

## 2024-11-20 ENCOUNTER — APPOINTMENT (OUTPATIENT)
Dept: ENDOCRINOLOGY | Facility: CLINIC | Age: 59
End: 2024-11-20
Payer: COMMERCIAL

## 2024-11-20 VITALS
HEIGHT: 66 IN | SYSTOLIC BLOOD PRESSURE: 116 MMHG | WEIGHT: 117 LBS | DIASTOLIC BLOOD PRESSURE: 72 MMHG | OXYGEN SATURATION: 98 % | HEART RATE: 70 BPM | BODY MASS INDEX: 18.8 KG/M2

## 2024-11-20 DIAGNOSIS — M81.0 AGE-RELATED OSTEOPOROSIS W/OUT CURRENT PATHOLOGICAL FRACTURE: ICD-10-CM

## 2024-11-20 PROCEDURE — 99214 OFFICE O/P EST MOD 30 MIN: CPT

## 2024-12-05 ENCOUNTER — NON-APPOINTMENT (OUTPATIENT)
Age: 59
End: 2024-12-05

## 2025-06-10 ENCOUNTER — NON-APPOINTMENT (OUTPATIENT)
Age: 60
End: 2025-06-10

## 2025-06-10 ENCOUNTER — APPOINTMENT (OUTPATIENT)
Dept: ENDOCRINOLOGY | Facility: CLINIC | Age: 60
End: 2025-06-10
Payer: COMMERCIAL

## 2025-06-10 VITALS
HEIGHT: 66 IN | OXYGEN SATURATION: 99 % | SYSTOLIC BLOOD PRESSURE: 118 MMHG | DIASTOLIC BLOOD PRESSURE: 60 MMHG | HEART RATE: 81 BPM | WEIGHT: 118 LBS | BODY MASS INDEX: 18.96 KG/M2

## 2025-06-10 LAB
25(OH)D3 SERPL-MCNC: 46.8 NG/ML
ANION GAP SERPL CALC-SCNC: 15 MMOL/L
BUN SERPL-MCNC: 9 MG/DL
CALCIUM SERPL-MCNC: 10.3 MG/DL
CALCIUM SERPL-MCNC: 10.3 MG/DL
CHLORIDE SERPL-SCNC: 107 MMOL/L
CO2 SERPL-SCNC: 22 MMOL/L
CREAT SERPL-MCNC: 0.58 MG/DL
EGFRCR SERPLBLD CKD-EPI 2021: 103 ML/MIN/1.73M2
GLUCOSE SERPL-MCNC: 102 MG/DL
PARATHYROID HORMONE INTACT: 25 PG/ML
PHOSPHATE SERPL-MCNC: 3.6 MG/DL
POTASSIUM SERPL-SCNC: 4.3 MMOL/L
SODIUM SERPL-SCNC: 144 MMOL/L
T4 SERPL-MCNC: 10.2 UG/DL
TSH SERPL-ACNC: 1.89 UIU/ML

## 2025-06-10 PROCEDURE — 99215 OFFICE O/P EST HI 40 MIN: CPT

## 2025-06-10 PROCEDURE — 36415 COLL VENOUS BLD VENIPUNCTURE: CPT

## 2025-06-10 PROCEDURE — G2211 COMPLEX E/M VISIT ADD ON: CPT | Mod: NC

## 2025-06-12 LAB — M PROTEIN SPEC IFE-MCNC: NORMAL

## 2025-06-13 LAB — OSTEOCALCIN SERPL-MCNC: 5.6 NG/ML
